# Patient Record
Sex: MALE | Race: WHITE | NOT HISPANIC OR LATINO | Employment: FULL TIME | URBAN - METROPOLITAN AREA
[De-identification: names, ages, dates, MRNs, and addresses within clinical notes are randomized per-mention and may not be internally consistent; named-entity substitution may affect disease eponyms.]

---

## 2017-07-10 LAB — CRC RECOMMENDATION EXT: NORMAL

## 2018-01-26 ENCOUNTER — HISTORICAL (OUTPATIENT)
Dept: ADMINISTRATIVE | Facility: HOSPITAL | Age: 53
End: 2018-01-26

## 2018-01-26 LAB
ABS NEUT (OLG): 3.68 X10(3)/MCL (ref 2.1–9.2)
ALBUMIN SERPL-MCNC: 4.2 GM/DL (ref 3.4–5)
ALBUMIN/GLOB SERPL: 1.2 {RATIO}
ALP SERPL-CCNC: 102 UNIT/L (ref 50–136)
ALT SERPL-CCNC: 52 UNIT/L (ref 12–78)
AST SERPL-CCNC: 26 UNIT/L (ref 15–37)
BASOPHILS # BLD AUTO: 0.1 X10(3)/MCL (ref 0–0.2)
BASOPHILS NFR BLD AUTO: 1 %
BILIRUB SERPL-MCNC: 0.9 MG/DL (ref 0.2–1)
BILIRUBIN DIRECT+TOT PNL SERPL-MCNC: 0.2 MG/DL (ref 0–0.2)
BILIRUBIN DIRECT+TOT PNL SERPL-MCNC: 0.7 MG/DL (ref 0–0.8)
BUN SERPL-MCNC: 13 MG/DL (ref 7–18)
CALCIUM SERPL-MCNC: 9.3 MG/DL (ref 8.5–10.1)
CHLORIDE SERPL-SCNC: 104 MMOL/L (ref 98–107)
CHOLEST SERPL-MCNC: 179 MG/DL (ref 0–200)
CHOLEST/HDLC SERPL: 5.6 {RATIO} (ref 0–5)
CO2 SERPL-SCNC: 27 MMOL/L (ref 21–32)
CREAT SERPL-MCNC: 1.25 MG/DL (ref 0.7–1.3)
EOSINOPHIL # BLD AUTO: 0.4 X10(3)/MCL (ref 0–0.9)
EOSINOPHIL NFR BLD AUTO: 6 %
ERYTHROCYTE [DISTWIDTH] IN BLOOD BY AUTOMATED COUNT: 12.1 % (ref 11.5–17)
GLOBULIN SER-MCNC: 3.4 GM/DL (ref 2.4–3.5)
GLUCOSE SERPL-MCNC: 96 MG/DL (ref 74–106)
HCT VFR BLD AUTO: 46.9 % (ref 42–52)
HDLC SERPL-MCNC: 32 MG/DL (ref 35–60)
HGB BLD-MCNC: 15.9 GM/DL (ref 14–18)
LDLC SERPL CALC-MCNC: 117 MG/DL (ref 0–129)
LYMPHOCYTES # BLD AUTO: 1.6 X10(3)/MCL (ref 0.6–4.6)
LYMPHOCYTES NFR BLD AUTO: 25 %
MCH RBC QN AUTO: 30.8 PG (ref 27–31)
MCHC RBC AUTO-ENTMCNC: 33.9 GM/DL (ref 33–36)
MCV RBC AUTO: 90.9 FL (ref 80–94)
MONOCYTES # BLD AUTO: 0.4 X10(3)/MCL (ref 0.1–1.3)
MONOCYTES NFR BLD AUTO: 7 %
NEUTROPHILS # BLD AUTO: 3.68 X10(3)/MCL (ref 1.4–7.9)
NEUTROPHILS NFR BLD AUTO: 60 %
PLATELET # BLD AUTO: 161 X10(3)/MCL (ref 130–400)
PMV BLD AUTO: 10.4 FL (ref 9.4–12.4)
POTASSIUM SERPL-SCNC: 4.4 MMOL/L (ref 3.5–5.1)
PROT SERPL-MCNC: 7.6 GM/DL (ref 6.4–8.2)
PSA SERPL-MCNC: 1.95 NG/ML (ref 0–4)
RBC # BLD AUTO: 5.16 X10(6)/MCL (ref 4.7–6.1)
SODIUM SERPL-SCNC: 140 MMOL/L (ref 136–145)
TRIGL SERPL-MCNC: 152 MG/DL (ref 30–150)
VLDLC SERPL CALC-MCNC: 30 MG/DL
WBC # SPEC AUTO: 6.1 X10(3)/MCL (ref 4.5–11.5)

## 2019-03-15 ENCOUNTER — HISTORICAL (OUTPATIENT)
Dept: ADMINISTRATIVE | Facility: HOSPITAL | Age: 54
End: 2019-03-15

## 2019-03-15 LAB
ABS NEUT (OLG): 3.19 X10(3)/MCL (ref 2.1–9.2)
ALBUMIN SERPL-MCNC: 3.9 GM/DL (ref 3.4–5)
ALBUMIN/GLOB SERPL: 1.4 RATIO (ref 1.1–2)
ALP SERPL-CCNC: 89 UNIT/L (ref 50–136)
ALT SERPL-CCNC: 90 UNIT/L (ref 12–78)
APPEARANCE, UA: CLEAR
AST SERPL-CCNC: 35 UNIT/L (ref 15–37)
BACTERIA SPEC CULT: NORMAL /HPF
BASOPHILS # BLD AUTO: 0.1 X10(3)/MCL (ref 0–0.2)
BASOPHILS NFR BLD AUTO: 2 %
BILIRUB SERPL-MCNC: 0.7 MG/DL (ref 0.2–1)
BILIRUB UR QL STRIP: NEGATIVE
BILIRUBIN DIRECT+TOT PNL SERPL-MCNC: 0.1 MG/DL (ref 0–0.5)
BILIRUBIN DIRECT+TOT PNL SERPL-MCNC: 0.6 MG/DL (ref 0–0.8)
BUN SERPL-MCNC: 17 MG/DL (ref 7–18)
CALCIUM SERPL-MCNC: 8.8 MG/DL (ref 8.5–10.1)
CHLORIDE SERPL-SCNC: 107 MMOL/L (ref 98–107)
CHOLEST SERPL-MCNC: 202 MG/DL (ref 0–200)
CHOLEST/HDLC SERPL: 6.7 {RATIO} (ref 0–5)
CO2 SERPL-SCNC: 28 MMOL/L (ref 21–32)
COLOR UR: YELLOW
CREAT SERPL-MCNC: 1.18 MG/DL (ref 0.7–1.3)
EOSINOPHIL # BLD AUTO: 0.3 X10(3)/MCL (ref 0–0.9)
EOSINOPHIL NFR BLD AUTO: 5 %
ERYTHROCYTE [DISTWIDTH] IN BLOOD BY AUTOMATED COUNT: 12.1 % (ref 11.5–17)
GLOBULIN SER-MCNC: 2.7 GM/DL (ref 2.4–3.5)
GLUCOSE (UA): NEGATIVE
GLUCOSE SERPL-MCNC: 99 MG/DL (ref 74–106)
HCT VFR BLD AUTO: 43.9 % (ref 42–52)
HDLC SERPL-MCNC: 30 MG/DL (ref 35–60)
HGB BLD-MCNC: 14.6 GM/DL (ref 14–18)
HGB UR QL STRIP: NEGATIVE
KETONES UR QL STRIP: NEGATIVE
LDLC SERPL CALC-MCNC: 128 MG/DL (ref 0–129)
LEUKOCYTE ESTERASE UR QL STRIP: NEGATIVE
LYMPHOCYTES # BLD AUTO: 1.4 X10(3)/MCL (ref 0.6–4.6)
LYMPHOCYTES NFR BLD AUTO: 26 %
MCH RBC QN AUTO: 31.5 PG (ref 27–31)
MCHC RBC AUTO-ENTMCNC: 33.3 GM/DL (ref 33–36)
MCV RBC AUTO: 94.6 FL (ref 80–94)
MONOCYTES # BLD AUTO: 0.5 X10(3)/MCL (ref 0.1–1.3)
MONOCYTES NFR BLD AUTO: 9 %
NEUTROPHILS # BLD AUTO: 3.19 X10(3)/MCL (ref 2.1–9.2)
NEUTROPHILS NFR BLD AUTO: 58 %
NITRITE UR QL STRIP: NEGATIVE
PH UR STRIP: 6 [PH] (ref 5–9)
PLATELET # BLD AUTO: 170 X10(3)/MCL (ref 130–400)
PMV BLD AUTO: 10.8 FL (ref 9.4–12.4)
POTASSIUM SERPL-SCNC: 3.9 MMOL/L (ref 3.5–5.1)
PROT SERPL-MCNC: 6.6 GM/DL (ref 6.4–8.2)
PROT UR QL STRIP: NEGATIVE
PSA SERPL-MCNC: 1.64 NG/ML (ref 0–4)
RBC # BLD AUTO: 4.64 X10(6)/MCL (ref 4.7–6.1)
RBC #/AREA URNS HPF: NORMAL /[HPF]
SODIUM SERPL-SCNC: 141 MMOL/L (ref 136–145)
SP GR UR STRIP: 1.01 (ref 1–1.03)
SQUAMOUS EPITHELIAL, UA: NORMAL
TRIGL SERPL-MCNC: 220 MG/DL (ref 30–150)
UROBILINOGEN UR STRIP-ACNC: 0.2
VLDLC SERPL CALC-MCNC: 44 MG/DL
WBC # SPEC AUTO: 5.5 X10(3)/MCL (ref 4.5–11.5)
WBC #/AREA URNS HPF: NORMAL /HPF

## 2019-05-30 ENCOUNTER — HISTORICAL (OUTPATIENT)
Dept: ADMINISTRATIVE | Facility: HOSPITAL | Age: 54
End: 2019-05-30

## 2019-05-30 LAB
ALBUMIN SERPL-MCNC: 4.1 GM/DL (ref 3.4–5)
ALBUMIN/GLOB SERPL: 1.4 {RATIO}
ALP SERPL-CCNC: 102 UNIT/L (ref 50–136)
ALT SERPL-CCNC: 68 UNIT/L (ref 12–78)
AST SERPL-CCNC: 27 UNIT/L (ref 15–37)
BILIRUB SERPL-MCNC: 0.7 MG/DL (ref 0.2–1)
BILIRUBIN DIRECT+TOT PNL SERPL-MCNC: 0.2 MG/DL (ref 0–0.2)
BILIRUBIN DIRECT+TOT PNL SERPL-MCNC: 0.5 MG/DL (ref 0–0.8)
BUN SERPL-MCNC: 15 MG/DL (ref 7–18)
CALCIUM SERPL-MCNC: 9.3 MG/DL (ref 8.5–10.1)
CHLORIDE SERPL-SCNC: 103 MMOL/L (ref 98–107)
CHOLEST SERPL-MCNC: 177 MG/DL (ref 0–200)
CHOLEST/HDLC SERPL: 5.4 {RATIO} (ref 0–5)
CO2 SERPL-SCNC: 30 MMOL/L (ref 21–32)
CREAT SERPL-MCNC: 1.17 MG/DL (ref 0.7–1.3)
GLOBULIN SER-MCNC: 3 GM/DL (ref 2.4–3.5)
GLUCOSE SERPL-MCNC: 94 MG/DL (ref 74–106)
HDLC SERPL-MCNC: 33 MG/DL (ref 35–60)
LDLC SERPL CALC-MCNC: 82 MG/DL (ref 0–129)
POTASSIUM SERPL-SCNC: 4.2 MMOL/L (ref 3.5–5.1)
PROT SERPL-MCNC: 7.1 GM/DL (ref 6.4–8.2)
SODIUM SERPL-SCNC: 139 MMOL/L (ref 136–145)
TRIGL SERPL-MCNC: 309 MG/DL (ref 30–150)
VLDLC SERPL CALC-MCNC: 62 MG/DL

## 2020-08-11 ENCOUNTER — HISTORICAL (OUTPATIENT)
Dept: ADMINISTRATIVE | Facility: HOSPITAL | Age: 55
End: 2020-08-11

## 2020-08-11 LAB
ABS NEUT (OLG): 3.19 X10(3)/MCL (ref 2.1–9.2)
ALBUMIN SERPL-MCNC: 4.1 GM/DL (ref 3.5–5)
ALBUMIN/GLOB SERPL: 1.6 RATIO (ref 1.1–2)
ALP SERPL-CCNC: 95 UNIT/L (ref 40–150)
ALT SERPL-CCNC: 61 UNIT/L (ref 0–55)
AST SERPL-CCNC: 28 UNIT/L (ref 5–34)
BASOPHILS # BLD AUTO: 0.1 X10(3)/MCL (ref 0–0.2)
BASOPHILS NFR BLD AUTO: 1 %
BILIRUB SERPL-MCNC: 0.9 MG/DL
BILIRUBIN DIRECT+TOT PNL SERPL-MCNC: 0.3 MG/DL (ref 0–0.5)
BILIRUBIN DIRECT+TOT PNL SERPL-MCNC: 0.6 MG/DL (ref 0–0.8)
BUN SERPL-MCNC: 13.6 MG/DL (ref 8.4–25.7)
CALCIUM SERPL-MCNC: 9.2 MG/DL (ref 8.4–10.2)
CHLORIDE SERPL-SCNC: 106 MMOL/L (ref 98–107)
CHOLEST SERPL-MCNC: 180 MG/DL
CHOLEST/HDLC SERPL: 6 {RATIO} (ref 0–5)
CO2 SERPL-SCNC: 27 MMOL/L (ref 22–29)
CREAT SERPL-MCNC: 1.13 MG/DL (ref 0.73–1.18)
EOSINOPHIL # BLD AUTO: 0.2 X10(3)/MCL (ref 0–0.9)
EOSINOPHIL NFR BLD AUTO: 4 %
ERYTHROCYTE [DISTWIDTH] IN BLOOD BY AUTOMATED COUNT: 12.4 % (ref 11.5–17)
GLOBULIN SER-MCNC: 2.6 GM/DL (ref 2.4–3.5)
GLUCOSE SERPL-MCNC: 97 MG/DL (ref 74–100)
HCT VFR BLD AUTO: 43.6 % (ref 42–52)
HDLC SERPL-MCNC: 28 MG/DL (ref 35–60)
HGB BLD-MCNC: 14.6 GM/DL (ref 14–18)
LDLC SERPL CALC-MCNC: 111 MG/DL (ref 50–140)
LYMPHOCYTES # BLD AUTO: 1.3 X10(3)/MCL (ref 0.6–4.6)
LYMPHOCYTES NFR BLD AUTO: 24 %
MCH RBC QN AUTO: 31.7 PG (ref 27–31)
MCHC RBC AUTO-ENTMCNC: 33.5 GM/DL (ref 33–36)
MCV RBC AUTO: 94.6 FL (ref 80–94)
MONOCYTES # BLD AUTO: 0.4 X10(3)/MCL (ref 0.1–1.3)
MONOCYTES NFR BLD AUTO: 8 %
NEUTROPHILS # BLD AUTO: 3.19 X10(3)/MCL (ref 2.1–9.2)
NEUTROPHILS NFR BLD AUTO: 61 %
PLATELET # BLD AUTO: 177 X10(3)/MCL (ref 130–400)
PMV BLD AUTO: 10.4 FL (ref 9.4–12.4)
POTASSIUM SERPL-SCNC: 4.4 MMOL/L (ref 3.5–5.1)
PROT SERPL-MCNC: 6.7 GM/DL (ref 6.4–8.3)
PSA SERPL-MCNC: 1.41 NG/ML
RBC # BLD AUTO: 4.61 X10(6)/MCL (ref 4.7–6.1)
SODIUM SERPL-SCNC: 142 MMOL/L (ref 136–145)
TRIGL SERPL-MCNC: 205 MG/DL (ref 34–140)
VLDLC SERPL CALC-MCNC: 41 MG/DL
WBC # SPEC AUTO: 5.2 X10(3)/MCL (ref 4.5–11.5)

## 2021-02-18 ENCOUNTER — HISTORICAL (OUTPATIENT)
Dept: ADMINISTRATIVE | Facility: HOSPITAL | Age: 56
End: 2021-02-18

## 2021-02-18 LAB
ALBUMIN SERPL-MCNC: 4.3 GM/DL (ref 3.5–5)
ALBUMIN/GLOB SERPL: 1.7 RATIO (ref 1.1–2)
ALP SERPL-CCNC: 96 UNIT/L (ref 40–150)
ALT SERPL-CCNC: 75 UNIT/L (ref 0–55)
AST SERPL-CCNC: 33 UNIT/L (ref 5–34)
BILIRUB SERPL-MCNC: 0.8 MG/DL
BILIRUBIN DIRECT+TOT PNL SERPL-MCNC: 0.3 MG/DL (ref 0–0.5)
BILIRUBIN DIRECT+TOT PNL SERPL-MCNC: 0.5 MG/DL (ref 0–0.8)
BUN SERPL-MCNC: 16.2 MG/DL (ref 8.4–25.7)
CALCIUM SERPL-MCNC: 9.4 MG/DL (ref 8.4–10.2)
CHLORIDE SERPL-SCNC: 108 MMOL/L (ref 98–107)
CHOLEST SERPL-MCNC: 183 MG/DL
CHOLEST/HDLC SERPL: 7 {RATIO} (ref 0–5)
CO2 SERPL-SCNC: 27 MMOL/L (ref 22–29)
CREAT SERPL-MCNC: 1.19 MG/DL (ref 0.73–1.18)
GLOBULIN SER-MCNC: 2.6 GM/DL (ref 2.4–3.5)
GLUCOSE SERPL-MCNC: 92 MG/DL (ref 74–100)
HDLC SERPL-MCNC: 26 MG/DL (ref 35–60)
LDLC SERPL CALC-MCNC: 107 MG/DL (ref 50–140)
POTASSIUM SERPL-SCNC: 4 MMOL/L (ref 3.5–5.1)
PROT SERPL-MCNC: 6.9 GM/DL (ref 6.4–8.3)
SODIUM SERPL-SCNC: 144 MMOL/L (ref 136–145)
TRIGL SERPL-MCNC: 248 MG/DL (ref 34–140)
VLDLC SERPL CALC-MCNC: 50 MG/DL

## 2021-03-11 ENCOUNTER — HISTORICAL (OUTPATIENT)
Dept: RADIOLOGY | Facility: HOSPITAL | Age: 56
End: 2021-03-11

## 2021-08-13 ENCOUNTER — HISTORICAL (OUTPATIENT)
Dept: ADMINISTRATIVE | Facility: HOSPITAL | Age: 56
End: 2021-08-13

## 2021-08-13 LAB
ABS NEUT (OLG): 3.7 X10(3)/MCL (ref 2.1–9.2)
ALBUMIN SERPL-MCNC: 4.2 GM/DL (ref 3.5–5)
ALBUMIN/GLOB SERPL: 1.6 RATIO (ref 1.1–2)
ALP SERPL-CCNC: 92 UNIT/L (ref 40–150)
ALT SERPL-CCNC: 50 UNIT/L (ref 0–55)
AST SERPL-CCNC: 30 UNIT/L (ref 5–34)
BASOPHILS # BLD AUTO: 0.1 X10(3)/MCL (ref 0–0.2)
BASOPHILS NFR BLD AUTO: 1 %
BILIRUB SERPL-MCNC: 0.6 MG/DL
BILIRUBIN DIRECT+TOT PNL SERPL-MCNC: 0.2 MG/DL (ref 0–0.5)
BILIRUBIN DIRECT+TOT PNL SERPL-MCNC: 0.4 MG/DL (ref 0–0.8)
BUN SERPL-MCNC: 13 MG/DL (ref 8.4–25.7)
CALCIUM SERPL-MCNC: 9.3 MG/DL (ref 8.4–10.2)
CHLORIDE SERPL-SCNC: 107 MMOL/L (ref 98–107)
CHOLEST SERPL-MCNC: 182 MG/DL
CHOLEST/HDLC SERPL: 6 {RATIO} (ref 0–5)
CO2 SERPL-SCNC: 28 MMOL/L (ref 22–29)
CREAT SERPL-MCNC: 1.14 MG/DL (ref 0.73–1.18)
EOSINOPHIL # BLD AUTO: 0.2 X10(3)/MCL (ref 0–0.9)
EOSINOPHIL NFR BLD AUTO: 4 %
ERYTHROCYTE [DISTWIDTH] IN BLOOD BY AUTOMATED COUNT: 12.1 % (ref 11.5–17)
GLOBULIN SER-MCNC: 2.6 GM/DL (ref 2.4–3.5)
GLUCOSE SERPL-MCNC: 95 MG/DL (ref 74–100)
HCT VFR BLD AUTO: 45 % (ref 42–52)
HDLC SERPL-MCNC: 31 MG/DL (ref 35–60)
HGB BLD-MCNC: 15.4 GM/DL (ref 14–18)
LDLC SERPL CALC-MCNC: 118 MG/DL (ref 50–140)
LYMPHOCYTES # BLD AUTO: 1.6 X10(3)/MCL (ref 0.6–4.6)
LYMPHOCYTES NFR BLD AUTO: 26 %
MCH RBC QN AUTO: 32 PG (ref 27–31)
MCHC RBC AUTO-ENTMCNC: 34.2 GM/DL (ref 33–36)
MCV RBC AUTO: 93.6 FL (ref 80–94)
MONOCYTES # BLD AUTO: 0.5 X10(3)/MCL (ref 0.1–1.3)
MONOCYTES NFR BLD AUTO: 8 %
NEUTROPHILS # BLD AUTO: 3.7 X10(3)/MCL (ref 2.1–9.2)
NEUTROPHILS NFR BLD AUTO: 61 %
PLATELET # BLD AUTO: 173 X10(3)/MCL (ref 130–400)
PMV BLD AUTO: 11.3 FL (ref 9.4–12.4)
POTASSIUM SERPL-SCNC: 4.5 MMOL/L (ref 3.5–5.1)
PROT SERPL-MCNC: 6.8 GM/DL (ref 6.4–8.3)
PSA SERPL-MCNC: 2.14 NG/ML
RBC # BLD AUTO: 4.81 X10(6)/MCL (ref 4.7–6.1)
SODIUM SERPL-SCNC: 144 MMOL/L (ref 136–145)
TRIGL SERPL-MCNC: 165 MG/DL (ref 34–140)
VLDLC SERPL CALC-MCNC: 33 MG/DL
WBC # SPEC AUTO: 6.1 X10(3)/MCL (ref 4.5–11.5)

## 2022-04-11 ENCOUNTER — HISTORICAL (OUTPATIENT)
Dept: ADMINISTRATIVE | Facility: HOSPITAL | Age: 57
End: 2022-04-11

## 2022-04-27 VITALS
BODY MASS INDEX: 33.33 KG/M2 | WEIGHT: 212.38 LBS | OXYGEN SATURATION: 95 % | SYSTOLIC BLOOD PRESSURE: 130 MMHG | HEIGHT: 67 IN | DIASTOLIC BLOOD PRESSURE: 86 MMHG

## 2022-08-16 DIAGNOSIS — E78.5 HYPERLIPIDEMIA, UNSPECIFIED HYPERLIPIDEMIA TYPE: ICD-10-CM

## 2022-08-16 DIAGNOSIS — K57.92 DIVERTICULITIS: ICD-10-CM

## 2022-08-16 DIAGNOSIS — I10 HYPERTENSION, UNSPECIFIED TYPE: ICD-10-CM

## 2022-08-16 DIAGNOSIS — Z00.00 WELLNESS EXAMINATION: Primary | ICD-10-CM

## 2022-08-23 ENCOUNTER — LAB VISIT (OUTPATIENT)
Dept: LAB | Facility: HOSPITAL | Age: 57
End: 2022-08-23
Attending: INTERNAL MEDICINE
Payer: COMMERCIAL

## 2022-08-23 DIAGNOSIS — I10 HYPERTENSION, UNSPECIFIED TYPE: ICD-10-CM

## 2022-08-23 DIAGNOSIS — E78.5 HYPERLIPIDEMIA, UNSPECIFIED HYPERLIPIDEMIA TYPE: ICD-10-CM

## 2022-08-23 DIAGNOSIS — Z00.00 WELLNESS EXAMINATION: ICD-10-CM

## 2022-08-23 DIAGNOSIS — K57.92 DIVERTICULITIS: ICD-10-CM

## 2022-08-23 LAB
ALBUMIN SERPL-MCNC: 4.1 GM/DL (ref 3.5–5)
ALBUMIN/GLOB SERPL: 1.6 RATIO (ref 1.1–2)
ALP SERPL-CCNC: 95 UNIT/L (ref 40–150)
ALT SERPL-CCNC: 48 UNIT/L (ref 0–55)
AST SERPL-CCNC: 29 UNIT/L (ref 5–34)
BASOPHILS # BLD AUTO: 0.05 X10(3)/MCL (ref 0–0.2)
BASOPHILS NFR BLD AUTO: 1 %
BILIRUBIN DIRECT+TOT PNL SERPL-MCNC: 1 MG/DL
BUN SERPL-MCNC: 13.8 MG/DL (ref 8.4–25.7)
CALCIUM SERPL-MCNC: 9.6 MG/DL (ref 8.4–10.2)
CHLORIDE SERPL-SCNC: 106 MMOL/L (ref 98–107)
CHOLEST SERPL-MCNC: 174 MG/DL
CHOLEST/HDLC SERPL: 6 {RATIO} (ref 0–5)
CO2 SERPL-SCNC: 28 MMOL/L (ref 22–29)
CREAT SERPL-MCNC: 1.28 MG/DL (ref 0.73–1.18)
EOSINOPHIL # BLD AUTO: 0.28 X10(3)/MCL (ref 0–0.9)
EOSINOPHIL NFR BLD AUTO: 5.4 %
ERYTHROCYTE [DISTWIDTH] IN BLOOD BY AUTOMATED COUNT: 12.4 % (ref 11.5–17)
GFR SERPLBLD CREATININE-BSD FMLA CKD-EPI: >60 MLS/MIN/1.73/M2
GLOBULIN SER-MCNC: 2.6 GM/DL (ref 2.4–3.5)
GLUCOSE SERPL-MCNC: 96 MG/DL (ref 74–100)
HCT VFR BLD AUTO: 44.3 % (ref 42–52)
HDLC SERPL-MCNC: 31 MG/DL (ref 35–60)
HGB BLD-MCNC: 14.9 GM/DL (ref 14–18)
IMM GRANULOCYTES # BLD AUTO: 0.01 X10(3)/MCL (ref 0–0.04)
IMM GRANULOCYTES NFR BLD AUTO: 0.2 %
LDLC SERPL CALC-MCNC: 101 MG/DL (ref 50–140)
LYMPHOCYTES # BLD AUTO: 1.32 X10(3)/MCL (ref 0.6–4.6)
LYMPHOCYTES NFR BLD AUTO: 25.4 %
MCH RBC QN AUTO: 31.8 PG (ref 27–31)
MCHC RBC AUTO-ENTMCNC: 33.6 MG/DL (ref 33–36)
MCV RBC AUTO: 94.5 FL (ref 80–94)
MONOCYTES # BLD AUTO: 0.45 X10(3)/MCL (ref 0.1–1.3)
MONOCYTES NFR BLD AUTO: 8.7 %
NEUTROPHILS # BLD AUTO: 3.1 X10(3)/MCL (ref 2.1–9.2)
NEUTROPHILS NFR BLD AUTO: 59.3 %
NRBC BLD AUTO-RTO: 0 %
PLATELET # BLD AUTO: 165 X10(3)/MCL (ref 130–400)
PMV BLD AUTO: 11.4 FL (ref 7.4–10.4)
POTASSIUM SERPL-SCNC: 4.4 MMOL/L (ref 3.5–5.1)
PROT SERPL-MCNC: 6.7 GM/DL (ref 6.4–8.3)
PSA SERPL-MCNC: 1.98 NG/ML
RBC # BLD AUTO: 4.69 X10(6)/MCL (ref 4.7–6.1)
SODIUM SERPL-SCNC: 140 MMOL/L (ref 136–145)
TRIGL SERPL-MCNC: 208 MG/DL (ref 34–140)
VLDLC SERPL CALC-MCNC: 42 MG/DL
WBC # SPEC AUTO: 5.2 X10(3)/MCL (ref 4.5–11.5)

## 2022-08-23 PROCEDURE — 84153 ASSAY OF PSA TOTAL: CPT

## 2022-08-23 PROCEDURE — 85025 COMPLETE CBC W/AUTO DIFF WBC: CPT

## 2022-08-23 PROCEDURE — 36415 COLL VENOUS BLD VENIPUNCTURE: CPT

## 2022-08-23 PROCEDURE — 80061 LIPID PANEL: CPT

## 2022-08-23 PROCEDURE — 80053 COMPREHEN METABOLIC PANEL: CPT

## 2022-08-24 ENCOUNTER — OFFICE VISIT (OUTPATIENT)
Dept: INTERNAL MEDICINE | Facility: CLINIC | Age: 57
End: 2022-08-24
Payer: COMMERCIAL

## 2022-08-24 VITALS
HEIGHT: 66 IN | DIASTOLIC BLOOD PRESSURE: 74 MMHG | WEIGHT: 213.63 LBS | BODY MASS INDEX: 34.33 KG/M2 | HEART RATE: 76 BPM | SYSTOLIC BLOOD PRESSURE: 126 MMHG | RESPIRATION RATE: 18 BRPM | OXYGEN SATURATION: 95 %

## 2022-08-24 DIAGNOSIS — R79.89 AZOTEMIA: ICD-10-CM

## 2022-08-24 DIAGNOSIS — E78.5 HYPERLIPIDEMIA, UNSPECIFIED HYPERLIPIDEMIA TYPE: ICD-10-CM

## 2022-08-24 DIAGNOSIS — K76.0 STEATOSIS OF LIVER: ICD-10-CM

## 2022-08-24 DIAGNOSIS — I10 HYPERTENSION, UNSPECIFIED TYPE: ICD-10-CM

## 2022-08-24 DIAGNOSIS — Z00.00 WELLNESS EXAMINATION: Primary | ICD-10-CM

## 2022-08-24 DIAGNOSIS — K63.5 BENIGN COLONIC POLYP: ICD-10-CM

## 2022-08-24 DIAGNOSIS — G47.33 OBSTRUCTIVE SLEEP APNEA SYNDROME: ICD-10-CM

## 2022-08-24 PROBLEM — J45.909 ASTHMATIC BRONCHITIS: Status: ACTIVE | Noted: 2022-08-24

## 2022-08-24 PROBLEM — E66.9 OBESITY: Status: ACTIVE | Noted: 2022-08-24

## 2022-08-24 PROBLEM — K57.92 DIVERTICULITIS: Status: ACTIVE | Noted: 2022-08-24

## 2022-08-24 PROCEDURE — 1159F MED LIST DOCD IN RCRD: CPT | Mod: CPTII,,, | Performed by: INTERNAL MEDICINE

## 2022-08-24 PROCEDURE — 1159F PR MEDICATION LIST DOCUMENTED IN MEDICAL RECORD: ICD-10-PCS | Mod: CPTII,,, | Performed by: INTERNAL MEDICINE

## 2022-08-24 PROCEDURE — 3008F PR BODY MASS INDEX (BMI) DOCUMENTED: ICD-10-PCS | Mod: CPTII,,, | Performed by: INTERNAL MEDICINE

## 2022-08-24 PROCEDURE — 99396 PREV VISIT EST AGE 40-64: CPT | Mod: ,,, | Performed by: INTERNAL MEDICINE

## 2022-08-24 PROCEDURE — 3074F PR MOST RECENT SYSTOLIC BLOOD PRESSURE < 130 MM HG: ICD-10-PCS | Mod: CPTII,,, | Performed by: INTERNAL MEDICINE

## 2022-08-24 PROCEDURE — 3078F DIAST BP <80 MM HG: CPT | Mod: CPTII,,, | Performed by: INTERNAL MEDICINE

## 2022-08-24 PROCEDURE — 3074F SYST BP LT 130 MM HG: CPT | Mod: CPTII,,, | Performed by: INTERNAL MEDICINE

## 2022-08-24 PROCEDURE — 99396 PR PREVENTIVE VISIT,EST,40-64: ICD-10-PCS | Mod: ,,, | Performed by: INTERNAL MEDICINE

## 2022-08-24 PROCEDURE — 3078F PR MOST RECENT DIASTOLIC BLOOD PRESSURE < 80 MM HG: ICD-10-PCS | Mod: CPTII,,, | Performed by: INTERNAL MEDICINE

## 2022-08-24 PROCEDURE — 3008F BODY MASS INDEX DOCD: CPT | Mod: CPTII,,, | Performed by: INTERNAL MEDICINE

## 2022-08-24 RX ORDER — LOSARTAN POTASSIUM AND HYDROCHLOROTHIAZIDE 12.5; 5 MG/1; MG/1
1 TABLET ORAL DAILY
Qty: 90 TABLET | Refills: 3 | Status: SHIPPED | OUTPATIENT
Start: 2022-08-24 | End: 2023-08-30 | Stop reason: SDUPTHER

## 2022-08-24 NOTE — PROGRESS NOTES
"Subjective:       Patient ID: Mariano Fontaine Jr. is a 56 y.o. male.    Chief Complaint: Annual Exam      The patient is a 56-year-old man in for wellness check.  He feels fine overall.  He has been doing fairly well with his exercise program.  Improving with his diet but at least he is maintained his weight.      He does not take Advil or Aleve.  Occasional BC powders and occasional Tylenol.  Diet is more less regular without protein restriction or supplements.  He probably could do better with hydration.    Review of Systems     Current Outpatient Medications on File Prior to Visit   Medication Sig Dispense Refill    [DISCONTINUED] losartan-hydrochlorothiazide 50-12.5 mg (HYZAAR) 50-12.5 mg per tablet TAKE 1 TABLET BY MOUTH EVERY DAY 90 tablet 3     No current facility-administered medications on file prior to visit.     Objective:      /74 (BP Location: Right arm, Patient Position: Sitting, BP Method: Large (Manual))   Pulse 76   Resp 18   Ht 5' 6" (1.676 m)   Wt 96.9 kg (213 lb 9.6 oz)   SpO2 95%   BMI 34.48 kg/m²     Physical Exam  Vitals reviewed.   Constitutional:       Appearance: Normal appearance.   HENT:      Head: Normocephalic and atraumatic.      Right Ear: Tympanic membrane normal.      Left Ear: Tympanic membrane normal.      Mouth/Throat:      Pharynx: Oropharynx is clear.   Eyes:      Pupils: Pupils are equal, round, and reactive to light.   Neck:      Vascular: No carotid bruit.   Cardiovascular:      Rate and Rhythm: Normal rate and regular rhythm.      Pulses: Normal pulses.      Heart sounds: Normal heart sounds.   Pulmonary:      Effort: Pulmonary effort is normal.      Breath sounds: Normal breath sounds.   Abdominal:      General: Abdomen is flat.      Palpations: Abdomen is soft. There is no mass.      Tenderness: There is no abdominal tenderness. There is no guarding.   Genitourinary:     Comments: Rectal exam reveals normal tone.  Large external hemorrhoids noted.  Prostate " medium-sized smooth without nodularity.  Musculoskeletal:         General: No swelling.      Cervical back: Neck supple.   Lymphadenopathy:      Cervical: No cervical adenopathy.   Skin:     General: Skin is warm and dry.   Neurological:      General: No focal deficit present.      Mental Status: He is alert and oriented to person, place, and time.         Assessment:       1. Wellness examination    2. Hypertension, unspecified type    3. Hyperlipidemia, unspecified hyperlipidemia type    4. Benign colonic polyp    5. Obstructive sleep apnea syndrome    6. Steatosis of liver    7. Azotemia      1. Overall stable and generally healthy    2. Controlled hypertension    3. Controlled hyperlipidemia.  He appeared to have a metabolic type syndrome    4. History of colon polyps.  Followed by Dr. Rendon.  Scope is to be scheduled this year he will arrange    5. History of obstructive sleep apnea.  Stable     6. Fatty liver.  Transaminases remain normal     6. Azotemia.  Relatively mild and likely not significant.  We discussed follow-up arrangements and I think 1 year of be acceptable and he agreed, to stay well hydrated avoid nonsteroidals and avoid excess protein in the diet.    Plan:       Same meds with with increased TLC.  Follow-up yearly with CBC CMP lipid PSA TSH and urinalysis prior.

## 2022-10-18 ENCOUNTER — DOCUMENTATION ONLY (OUTPATIENT)
Dept: ADMINISTRATIVE | Facility: HOSPITAL | Age: 57
End: 2022-10-18
Payer: COMMERCIAL

## 2022-11-28 PROBLEM — Z00.00 WELLNESS EXAMINATION: Status: RESOLVED | Noted: 2022-08-24 | Resolved: 2022-11-28

## 2022-12-21 ENCOUNTER — OFFICE VISIT (OUTPATIENT)
Dept: INTERNAL MEDICINE | Facility: CLINIC | Age: 57
End: 2022-12-21
Payer: COMMERCIAL

## 2022-12-21 VITALS
WEIGHT: 214.81 LBS | OXYGEN SATURATION: 96 % | BODY MASS INDEX: 34.52 KG/M2 | HEART RATE: 77 BPM | SYSTOLIC BLOOD PRESSURE: 132 MMHG | DIASTOLIC BLOOD PRESSURE: 80 MMHG | RESPIRATION RATE: 18 BRPM | HEIGHT: 66 IN

## 2022-12-21 DIAGNOSIS — I10 HYPERTENSION, UNSPECIFIED TYPE: Primary | ICD-10-CM

## 2022-12-21 DIAGNOSIS — M79.675 PAIN IN TOES OF BOTH FEET: ICD-10-CM

## 2022-12-21 DIAGNOSIS — R79.89 AZOTEMIA: ICD-10-CM

## 2022-12-21 DIAGNOSIS — E78.5 HYPERLIPIDEMIA, UNSPECIFIED HYPERLIPIDEMIA TYPE: ICD-10-CM

## 2022-12-21 DIAGNOSIS — M79.674 PAIN IN TOES OF BOTH FEET: ICD-10-CM

## 2022-12-21 DIAGNOSIS — M76.60 ACHILLES TENDINITIS, UNSPECIFIED LATERALITY: ICD-10-CM

## 2022-12-21 PROCEDURE — 1159F PR MEDICATION LIST DOCUMENTED IN MEDICAL RECORD: ICD-10-PCS | Mod: CPTII,,, | Performed by: INTERNAL MEDICINE

## 2022-12-21 PROCEDURE — 3008F BODY MASS INDEX DOCD: CPT | Mod: CPTII,,, | Performed by: INTERNAL MEDICINE

## 2022-12-21 PROCEDURE — 3008F PR BODY MASS INDEX (BMI) DOCUMENTED: ICD-10-PCS | Mod: CPTII,,, | Performed by: INTERNAL MEDICINE

## 2022-12-21 PROCEDURE — 3075F PR MOST RECENT SYSTOLIC BLOOD PRESS GE 130-139MM HG: ICD-10-PCS | Mod: CPTII,,, | Performed by: INTERNAL MEDICINE

## 2022-12-21 PROCEDURE — 3079F PR MOST RECENT DIASTOLIC BLOOD PRESSURE 80-89 MM HG: ICD-10-PCS | Mod: CPTII,,, | Performed by: INTERNAL MEDICINE

## 2022-12-21 PROCEDURE — 3079F DIAST BP 80-89 MM HG: CPT | Mod: CPTII,,, | Performed by: INTERNAL MEDICINE

## 2022-12-21 PROCEDURE — 99213 PR OFFICE/OUTPT VISIT, EST, LEVL III, 20-29 MIN: ICD-10-PCS | Mod: ,,, | Performed by: INTERNAL MEDICINE

## 2022-12-21 PROCEDURE — 99213 OFFICE O/P EST LOW 20 MIN: CPT | Mod: ,,, | Performed by: INTERNAL MEDICINE

## 2022-12-21 PROCEDURE — 3075F SYST BP GE 130 - 139MM HG: CPT | Mod: CPTII,,, | Performed by: INTERNAL MEDICINE

## 2022-12-21 PROCEDURE — 1159F MED LIST DOCD IN RCRD: CPT | Mod: CPTII,,, | Performed by: INTERNAL MEDICINE

## 2022-12-21 NOTE — PROGRESS NOTES
"Subjective:       Patient ID: Mariano Fontaine Jr. is a 57 y.o. male.    Chief Complaint: Foot Pain (Knot on left heel tendon, gout(he thinks in left big toe). He also states it may be gout in right big toe also)      The patient is a 57-year-old man in for problems with heel pain.  He recently had a bout of Achilles tendinitis on the right and now he is had an episode on the left.  Somewhat complicated by toe pain he thinks he may have gout as well.  He has seen the podiatrist.  He has not been doing stretching routinely.  He is not been using ice.  He has been wearing good footwear.  He is really not sure why it started.    Foot Pain    Review of Systems     Current Outpatient Medications on File Prior to Visit   Medication Sig Dispense Refill    losartan-hydrochlorothiazide 50-12.5 mg (HYZAAR) 50-12.5 mg per tablet Take 1 tablet by mouth once daily. 90 tablet 3     No current facility-administered medications on file prior to visit.     Objective:      /80 (BP Location: Left arm, Patient Position: Sitting, BP Method: Large (Manual))   Pulse 77   Resp 18   Ht 5' 6" (1.676 m)   Wt 97.4 kg (214 lb 12.8 oz)   SpO2 96%   BMI 34.67 kg/m²     Physical Exam      He is in no distress.  Vital signs are stable.    Extremities reveal no clubbing cyanosis or edema.  Foot pulses intact.  There is a tender bump on the left heel near the insertion of the Achilles tendon.  There is no real tenderness of the tendon higher up.  Assessment:       1. Hypertension, unspecified type    2. Hyperlipidemia, unspecified hyperlipidemia type    3. Azotemia    4. Achilles tendinitis, unspecified laterality    5. Pain in toes of both feet          Plan:       1. Recurrent Achilles tendonitis.  Now on the left side he is already had issues on the right and also plantar fasciitis on the right.      2. Hypertension.  Control     3. Hyperlipidemia.  Stable     4. Slight azotemia.      5. Concerns for gout.      Check uric acid and BMP " today.  Ice stretching etc. discussed.  Foot where also.  Follow-up with me as scheduled.  He should avoid fluoroquinolones

## 2022-12-23 ENCOUNTER — TELEPHONE (OUTPATIENT)
Dept: INTERNAL MEDICINE | Facility: CLINIC | Age: 57
End: 2022-12-23
Payer: COMMERCIAL

## 2022-12-23 DIAGNOSIS — M10.9 GOUT, UNSPECIFIED CAUSE, UNSPECIFIED CHRONICITY, UNSPECIFIED SITE: Primary | ICD-10-CM

## 2022-12-23 RX ORDER — ALLOPURINOL 300 MG/1
300 TABLET ORAL DAILY
Qty: 30 TABLET | Status: SHIPPED | OUTPATIENT
Start: 2022-12-23 | End: 2023-08-30

## 2022-12-23 NOTE — TELEPHONE ENCOUNTER
----- Message from Kevin Carmen MD sent at 12/22/2022  5:15 PM CST -----  Tell him chemistry test is normal.  Kidney function and electrolytes were normal.  However uric acid is quite high and is consistent with gout.  I do recommend that he start taking a medicine to lower the uric acid and prevent gout attacks.  That medicine is allopurinol 300 mg, number 30, 1 p.o. daily with p.r.n. refills.  This is a maintenance medication to prevent gout attacks and also to help prevent uric acid kidney stones.  ----- Message -----  From: Background User Lab  Sent: 12/21/2022   6:51 PM CST  To: Kevin Carmen MD

## 2023-01-16 LAB — CRC RECOMMENDATION EXT: NORMAL

## 2023-01-17 ENCOUNTER — DOCUMENTATION ONLY (OUTPATIENT)
Dept: INTERNAL MEDICINE | Facility: CLINIC | Age: 58
End: 2023-01-17
Payer: COMMERCIAL

## 2023-08-11 ENCOUNTER — TELEPHONE (OUTPATIENT)
Dept: INTERNAL MEDICINE | Facility: CLINIC | Age: 58
End: 2023-08-11
Payer: COMMERCIAL

## 2023-08-11 DIAGNOSIS — K57.92 DIVERTICULITIS: Primary | ICD-10-CM

## 2023-08-11 RX ORDER — AMOXICILLIN AND CLAVULANATE POTASSIUM 875; 125 MG/1; MG/1
1 TABLET, FILM COATED ORAL EVERY 12 HOURS
Qty: 20 TABLET | Refills: 1 | Status: SHIPPED | OUTPATIENT
Start: 2023-08-11 | End: 2023-08-30

## 2023-08-11 NOTE — TELEPHONE ENCOUNTER
----- Message from Berkley Monaco sent at 8/11/2023  8:07 AM CDT -----  Regarding: refill  Type:  RX Refill Request    Who Called: pt's wife pawan  Refill or New Rx:refill  RX Name and Strength:medication for diverticulitis  How is the patient currently taking it? (ex. 1XDay):  Is this a 30 day or 90 day RX:  Preferred Pharmacy with phone number:[facility] in [place]  Local or Mail Order:local  Ordering Provider:dr milian  Would the patient rather a call back or a response via [facility]? C/b  Best Call Back Number:[phone]  Additional Information: bout with diverticulitis started 2 days ago

## 2023-08-23 ENCOUNTER — TELEPHONE (OUTPATIENT)
Dept: INTERNAL MEDICINE | Facility: CLINIC | Age: 58
End: 2023-08-23
Payer: COMMERCIAL

## 2023-08-23 NOTE — TELEPHONE ENCOUNTER
----- Message from Neeta Meier LPN sent at 8/22/2023 11:03 AM CDT -----  Regarding: elijah Manriquez 8/30 @9:20  Fasting labs needed.

## 2023-08-29 ENCOUNTER — LAB VISIT (OUTPATIENT)
Dept: LAB | Facility: HOSPITAL | Age: 58
End: 2023-08-29
Attending: INTERNAL MEDICINE
Payer: COMMERCIAL

## 2023-08-29 DIAGNOSIS — E78.5 HYPERLIPIDEMIA, UNSPECIFIED HYPERLIPIDEMIA TYPE: ICD-10-CM

## 2023-08-29 DIAGNOSIS — R79.89 AZOTEMIA: ICD-10-CM

## 2023-08-29 DIAGNOSIS — I10 HYPERTENSION, UNSPECIFIED TYPE: ICD-10-CM

## 2023-08-29 DIAGNOSIS — Z00.00 WELLNESS EXAMINATION: ICD-10-CM

## 2023-08-29 LAB
ALBUMIN SERPL-MCNC: 4 G/DL (ref 3.5–5)
ALBUMIN/GLOB SERPL: 1.5 RATIO (ref 1.1–2)
ALP SERPL-CCNC: 99 UNIT/L (ref 40–150)
ALT SERPL-CCNC: 48 UNIT/L (ref 0–55)
APPEARANCE UR: CLEAR
AST SERPL-CCNC: 30 UNIT/L (ref 5–34)
BACTERIA #/AREA URNS AUTO: NORMAL /HPF
BASOPHILS # BLD AUTO: 0.07 X10(3)/MCL
BASOPHILS NFR BLD AUTO: 1.4 %
BILIRUB SERPL-MCNC: 1 MG/DL
BILIRUB UR QL STRIP.AUTO: NEGATIVE
BUN SERPL-MCNC: 14.9 MG/DL (ref 8.4–25.7)
CALCIUM SERPL-MCNC: 9.4 MG/DL (ref 8.4–10.2)
CHLORIDE SERPL-SCNC: 109 MMOL/L (ref 98–107)
CHOLEST SERPL-MCNC: 187 MG/DL
CHOLEST/HDLC SERPL: 6 {RATIO} (ref 0–5)
CO2 SERPL-SCNC: 25 MMOL/L (ref 22–29)
COLOR UR: YELLOW
CREAT SERPL-MCNC: 1.17 MG/DL (ref 0.73–1.18)
EOSINOPHIL # BLD AUTO: 0.25 X10(3)/MCL (ref 0–0.9)
EOSINOPHIL NFR BLD AUTO: 4.8 %
ERYTHROCYTE [DISTWIDTH] IN BLOOD BY AUTOMATED COUNT: 12.1 % (ref 11.5–17)
GFR SERPLBLD CREATININE-BSD FMLA CKD-EPI: >60 MLS/MIN/1.73/M2
GLOBULIN SER-MCNC: 2.7 GM/DL (ref 2.4–3.5)
GLUCOSE SERPL-MCNC: 110 MG/DL (ref 74–100)
GLUCOSE UR QL STRIP.AUTO: NEGATIVE
HCT VFR BLD AUTO: 44.8 % (ref 42–52)
HDLC SERPL-MCNC: 29 MG/DL (ref 35–60)
HGB BLD-MCNC: 15.5 G/DL (ref 14–18)
IMM GRANULOCYTES # BLD AUTO: 0.02 X10(3)/MCL (ref 0–0.04)
IMM GRANULOCYTES NFR BLD AUTO: 0.4 %
KETONES UR QL STRIP.AUTO: NEGATIVE
LDLC SERPL CALC-MCNC: 100 MG/DL (ref 50–140)
LEUKOCYTE ESTERASE UR QL STRIP.AUTO: NEGATIVE
LYMPHOCYTES # BLD AUTO: 0.92 X10(3)/MCL (ref 0.6–4.6)
LYMPHOCYTES NFR BLD AUTO: 17.8 %
MCH RBC QN AUTO: 31.8 PG (ref 27–31)
MCHC RBC AUTO-ENTMCNC: 34.6 G/DL (ref 33–36)
MCV RBC AUTO: 92 FL (ref 80–94)
MONOCYTES # BLD AUTO: 0.49 X10(3)/MCL (ref 0.1–1.3)
MONOCYTES NFR BLD AUTO: 9.5 %
NEUTROPHILS # BLD AUTO: 3.42 X10(3)/MCL (ref 2.1–9.2)
NEUTROPHILS NFR BLD AUTO: 66.1 %
NITRITE UR QL STRIP.AUTO: NEGATIVE
NRBC BLD AUTO-RTO: 0 %
PH UR STRIP.AUTO: 6 [PH]
PLATELET # BLD AUTO: 193 X10(3)/MCL (ref 130–400)
PMV BLD AUTO: 10.7 FL (ref 7.4–10.4)
POTASSIUM SERPL-SCNC: 4 MMOL/L (ref 3.5–5.1)
PROT SERPL-MCNC: 6.7 GM/DL (ref 6.4–8.3)
PROT UR QL STRIP.AUTO: NEGATIVE
PSA SERPL-MCNC: 3.66 NG/ML
RBC # BLD AUTO: 4.87 X10(6)/MCL (ref 4.7–6.1)
RBC #/AREA URNS AUTO: <5 /HPF
RBC UR QL AUTO: NEGATIVE
SODIUM SERPL-SCNC: 141 MMOL/L (ref 136–145)
SP GR UR STRIP.AUTO: 1.02 (ref 1–1.03)
SQUAMOUS #/AREA URNS AUTO: <5 /HPF
TRIGL SERPL-MCNC: 289 MG/DL (ref 34–140)
TSH SERPL-ACNC: 1.46 UIU/ML (ref 0.35–4.94)
UROBILINOGEN UR STRIP-ACNC: 0.2
VLDLC SERPL CALC-MCNC: 58 MG/DL
WBC # SPEC AUTO: 5.17 X10(3)/MCL (ref 4.5–11.5)
WBC #/AREA URNS AUTO: <5 /HPF

## 2023-08-29 PROCEDURE — 80053 COMPREHEN METABOLIC PANEL: CPT

## 2023-08-29 PROCEDURE — 85025 COMPLETE CBC W/AUTO DIFF WBC: CPT

## 2023-08-29 PROCEDURE — 81001 URINALYSIS AUTO W/SCOPE: CPT

## 2023-08-29 PROCEDURE — 84443 ASSAY THYROID STIM HORMONE: CPT

## 2023-08-29 PROCEDURE — 80061 LIPID PANEL: CPT

## 2023-08-29 PROCEDURE — 36415 COLL VENOUS BLD VENIPUNCTURE: CPT

## 2023-08-29 PROCEDURE — 84153 ASSAY OF PSA TOTAL: CPT

## 2023-08-30 ENCOUNTER — OFFICE VISIT (OUTPATIENT)
Dept: INTERNAL MEDICINE | Facility: CLINIC | Age: 58
End: 2023-08-30
Payer: COMMERCIAL

## 2023-08-30 VITALS
OXYGEN SATURATION: 96 % | HEIGHT: 66 IN | SYSTOLIC BLOOD PRESSURE: 136 MMHG | RESPIRATION RATE: 18 BRPM | DIASTOLIC BLOOD PRESSURE: 84 MMHG | BODY MASS INDEX: 34.17 KG/M2 | HEART RATE: 90 BPM | WEIGHT: 212.63 LBS

## 2023-08-30 DIAGNOSIS — R93.89 ABNORMAL CT OF THE CHEST: ICD-10-CM

## 2023-08-30 DIAGNOSIS — E66.9 OBESITY, UNSPECIFIED CLASSIFICATION, UNSPECIFIED OBESITY TYPE, UNSPECIFIED WHETHER SERIOUS COMORBIDITY PRESENT: ICD-10-CM

## 2023-08-30 DIAGNOSIS — R91.1 SOLITARY PULMONARY NODULE: ICD-10-CM

## 2023-08-30 DIAGNOSIS — R97.20 INCREASED PROSTATE SPECIFIC ANTIGEN (PSA) VELOCITY: ICD-10-CM

## 2023-08-30 DIAGNOSIS — M10.9 GOUT, UNSPECIFIED CAUSE, UNSPECIFIED CHRONICITY, UNSPECIFIED SITE: ICD-10-CM

## 2023-08-30 DIAGNOSIS — E78.5 HYPERLIPIDEMIA, UNSPECIFIED HYPERLIPIDEMIA TYPE: ICD-10-CM

## 2023-08-30 DIAGNOSIS — K76.0 STEATOSIS OF LIVER: ICD-10-CM

## 2023-08-30 DIAGNOSIS — K57.92 DIVERTICULITIS: ICD-10-CM

## 2023-08-30 DIAGNOSIS — I10 HYPERTENSION, UNSPECIFIED TYPE: ICD-10-CM

## 2023-08-30 DIAGNOSIS — R73.9 HYPERGLYCEMIA: ICD-10-CM

## 2023-08-30 DIAGNOSIS — R79.89 AZOTEMIA: ICD-10-CM

## 2023-08-30 DIAGNOSIS — Z00.00 WELLNESS EXAMINATION: Primary | ICD-10-CM

## 2023-08-30 DIAGNOSIS — G47.33 OBSTRUCTIVE SLEEP APNEA SYNDROME: ICD-10-CM

## 2023-08-30 DIAGNOSIS — E04.2 GOITER, NONTOXIC, MULTINODULAR: ICD-10-CM

## 2023-08-30 PROCEDURE — 3008F PR BODY MASS INDEX (BMI) DOCUMENTED: ICD-10-PCS | Mod: CPTII,,, | Performed by: INTERNAL MEDICINE

## 2023-08-30 PROCEDURE — 3079F PR MOST RECENT DIASTOLIC BLOOD PRESSURE 80-89 MM HG: ICD-10-PCS | Mod: CPTII,,, | Performed by: INTERNAL MEDICINE

## 2023-08-30 PROCEDURE — 3075F SYST BP GE 130 - 139MM HG: CPT | Mod: CPTII,,, | Performed by: INTERNAL MEDICINE

## 2023-08-30 PROCEDURE — 99396 PR PREVENTIVE VISIT,EST,40-64: ICD-10-PCS | Mod: ,,, | Performed by: INTERNAL MEDICINE

## 2023-08-30 PROCEDURE — 1159F MED LIST DOCD IN RCRD: CPT | Mod: CPTII,,, | Performed by: INTERNAL MEDICINE

## 2023-08-30 PROCEDURE — 3008F BODY MASS INDEX DOCD: CPT | Mod: CPTII,,, | Performed by: INTERNAL MEDICINE

## 2023-08-30 PROCEDURE — 3075F PR MOST RECENT SYSTOLIC BLOOD PRESS GE 130-139MM HG: ICD-10-PCS | Mod: CPTII,,, | Performed by: INTERNAL MEDICINE

## 2023-08-30 PROCEDURE — 99396 PREV VISIT EST AGE 40-64: CPT | Mod: ,,, | Performed by: INTERNAL MEDICINE

## 2023-08-30 PROCEDURE — 3079F DIAST BP 80-89 MM HG: CPT | Mod: CPTII,,, | Performed by: INTERNAL MEDICINE

## 2023-08-30 PROCEDURE — 1159F PR MEDICATION LIST DOCUMENTED IN MEDICAL RECORD: ICD-10-PCS | Mod: CPTII,,, | Performed by: INTERNAL MEDICINE

## 2023-08-30 RX ORDER — LOSARTAN POTASSIUM AND HYDROCHLOROTHIAZIDE 12.5; 5 MG/1; MG/1
1 TABLET ORAL DAILY
Qty: 90 TABLET | Refills: 3 | Status: SHIPPED | OUTPATIENT
Start: 2023-08-30

## 2023-08-30 NOTE — PROGRESS NOTES
"Subjective:       Patient ID: Mariano Fontaine Jr. is a 57 y.o. male.    Chief Complaint: Annual Exam (Pt stated he went to [facility] ER a month ago due to a swollen throat, pt stated he was told it was his thyroid)      Is a 57-year-old man in for wellness check.  He feels fine overall.      He did have a problem with swelling in his neck about a month ago.  It was his 1st day of a new job.  He went to the walk-in clinic and was sent to the emergency room.  Evaluation there was unremarkable.  He had a CT of the neck that did show some incidental findings including multiple thyroid nodules and a few nodules that were small in both upper lobes of the lungs.  Radiologist recommended a CT scan of the chest in 1 year.  He also recommended a thyroid ultrasound.    Patient is a nonsmoker.  He was exposed to secondhand smoke as a kid.  His father  of lung cancer and was a smoker.    He has been walking 2 miles a day 4 days a week but stopped because of the heat but plans to start getting up early to do it again.      Review of Systems     Current Outpatient Medications on File Prior to Visit   Medication Sig Dispense Refill    [DISCONTINUED] losartan-hydrochlorothiazide 50-12.5 mg (HYZAAR) 50-12.5 mg per tablet Take 1 tablet by mouth once daily. 90 tablet 3    [DISCONTINUED] allopurinoL (ZYLOPRIM) 300 MG tablet Take 1 tablet (300 mg total) by mouth once daily. (Patient not taking: Reported on 2023) 30 tablet PRN    [DISCONTINUED] amoxicillin-clavulanate 875-125mg (AUGMENTIN) 875-125 mg per tablet Take 1 tablet by mouth every 12 (twelve) hours. (Patient not taking: Reported on 2023) 20 tablet 1     No current facility-administered medications on file prior to visit.     Objective:      /84 (BP Location: Right arm, Patient Position: Sitting, BP Method: Large (Manual))   Pulse 90   Resp 18   Ht 5' 6" (1.676 m)   Wt 96.4 kg (212 lb 9.6 oz)   SpO2 96%   BMI 34.31 kg/m²     Physical Exam  Vitals reviewed. "   Constitutional:       Appearance: Normal appearance.   HENT:      Head: Normocephalic and atraumatic.      Right Ear: Tympanic membrane normal.      Left Ear: Tympanic membrane normal.      Mouth/Throat:      Pharynx: Oropharynx is clear.   Eyes:      Pupils: Pupils are equal, round, and reactive to light.   Neck:      Vascular: No carotid bruit.   Cardiovascular:      Rate and Rhythm: Normal rate and regular rhythm.      Pulses: Normal pulses.      Heart sounds: Normal heart sounds.   Pulmonary:      Effort: Pulmonary effort is normal.      Breath sounds: Normal breath sounds.   Abdominal:      General: Abdomen is flat.      Palpations: Abdomen is soft. There is no mass.      Tenderness: There is no abdominal tenderness. There is no guarding.   Genitourinary:     Comments: Prostate medium-sized smooth without nodules.  Heme-negative stool.  Musculoskeletal:         General: No swelling.      Cervical back: Neck supple.   Lymphadenopathy:      Cervical: No cervical adenopathy.   Skin:     General: Skin is warm and dry.   Neurological:      General: No focal deficit present.      Mental Status: He is alert and oriented to person, place, and time.       Lab work reviewed  Assessment:       1. Wellness    2. Metabolic syndrome    3. Hypertension.  Controlled on current medicine    4. Hyperlipidemia.  Primarily high triglyceride and low HDL.  Worse    5. Hyperglycemia.  Also worse     6. Multiple small pulmonary nodule seen on CT neck.  Likely benign incidental findings     7. Multiple thyroid nodules noted on CT neck recently.  Incidental finding also.  Clinically and chemically euthyroid     8. Fatty liver.  Likely persist but liver enzymes remain negative     9. History of azotemia.  Resolved    10. Increased PSA velocity.    11. Gout.  No recurrence and not on meds    12. History of diverticulitis.  Currently stable    Plan:     Continue same meds but increased TLC.  Especially low carb weight reducing diet.   Warned about the risk of diabetes mellitus.    Follow-up with me 6 months with CMP lipid A1c and PSA    Check thyroid ultrasound for assessment of nodules and possible baseline study     Check CT chest in 11 months without contrast.  If stability noted at that time the no further studies will be needed

## 2023-09-08 ENCOUNTER — HOSPITAL ENCOUNTER (OUTPATIENT)
Dept: RADIOLOGY | Facility: HOSPITAL | Age: 58
Discharge: HOME | End: 2023-09-08
Attending: INTERNAL MEDICINE
Payer: COMMERCIAL

## 2023-09-08 DIAGNOSIS — R91.1 SOLITARY PULMONARY NODULE: ICD-10-CM

## 2023-09-08 DIAGNOSIS — E04.2 GOITER, NONTOXIC, MULTINODULAR: ICD-10-CM

## 2023-09-08 PROCEDURE — 71250 CT THORAX DX C-: CPT | Mod: TC

## 2023-09-08 PROCEDURE — 76536 US EXAM OF HEAD AND NECK: CPT | Mod: TC

## 2023-09-11 DIAGNOSIS — R91.1 SOLITARY PULMONARY NODULE: ICD-10-CM

## 2023-09-11 DIAGNOSIS — E04.2 MULTIPLE THYROID NODULES: ICD-10-CM

## 2023-09-11 DIAGNOSIS — R91.8 MULTIPLE PULMONARY NODULES: Primary | ICD-10-CM

## 2023-09-12 ENCOUNTER — TELEPHONE (OUTPATIENT)
Dept: INTERNAL MEDICINE | Facility: CLINIC | Age: 58
End: 2023-09-12
Payer: COMMERCIAL

## 2023-09-12 NOTE — TELEPHONE ENCOUNTER
----- Message from Farrah Dominguez sent at 9/12/2023  8:35 AM CDT -----  Regarding: call back  .Type:  Patient Returning Call    Who Called:gail  Who Left Message for Patient:  Does the patient know what this is regarding? ent  Would the patient rather a call back or a response via [facility]?   Best Call Back Number:[phone]  Additional Information: pt states he missed a call

## 2023-09-12 NOTE — TELEPHONE ENCOUNTER
Attempted to contact pt back at home and cell number was unsuccessful will attempt to contact pt back.

## 2023-09-13 ENCOUNTER — TELEPHONE (OUTPATIENT)
Dept: INTERNAL MEDICINE | Facility: CLINIC | Age: 58
End: 2023-09-13
Payer: COMMERCIAL

## 2023-09-13 DIAGNOSIS — R91.8 MULTIPLE PULMONARY NODULES: ICD-10-CM

## 2023-09-13 DIAGNOSIS — E04.2 MULTIPLE THYROID NODULES: Primary | ICD-10-CM

## 2023-09-13 NOTE — TELEPHONE ENCOUNTER
----- Message from Orlando Quevedo sent at 9/13/2023  1:30 PM CDT -----  .Type:  Patient Returning Call    Who Called:pt  Who Left Message for Patient:  Does the patient know what this is regarding?:reference for ENT Dr. Ania Rodriguez   Would the patient rather a call back or a response via [facility]? Call back   Best Call Back Number:[phone]  Additional Information: Called back line no answer

## 2023-09-27 ENCOUNTER — TELEPHONE (OUTPATIENT)
Dept: INTERNAL MEDICINE | Facility: CLINIC | Age: 58
End: 2023-09-27
Payer: COMMERCIAL

## 2023-09-27 NOTE — TELEPHONE ENCOUNTER
----- Message from Berkley Monaco sent at 9/27/2023 11:29 AM CDT -----  Regarding: referral  Type:  Patient Requesting Referral    Who Called:pt's wife    Referral to What Specialty:ENT   Reason for Referral:thyroid  Does the patient want the referral with a specific physician?:Dr Ania Rodriguez    Would the patient rather a call back or a response via [facility]? C/b   Best Call Back Number:[phone]    Additional Information: pt s/w Dr Brumfield office and they never rcvd the referral, please refax to # [phone]

## 2023-09-28 ENCOUNTER — TELEPHONE (OUTPATIENT)
Dept: INTERNAL MEDICINE | Facility: CLINIC | Age: 58
End: 2023-09-28
Payer: COMMERCIAL

## 2023-09-28 NOTE — TELEPHONE ENCOUNTER
----- Message from Karen Raymond sent at 9/28/2023  3:20 PM CDT -----    Lisa Fontaine  called to requesting  recent blood work and ultrasound results be sent over to Dr Shelly Rodriguez office.  She stated the office had just faxed over a referral to the Dr Rodriguez. Dr Rodriguez office stated they need recent blood work and ultrasound results.        call back # is [phone]

## 2023-12-11 ENCOUNTER — TELEPHONE (OUTPATIENT)
Dept: INTERNAL MEDICINE | Facility: CLINIC | Age: 58
End: 2023-12-11
Payer: COMMERCIAL

## 2024-02-26 ENCOUNTER — TELEPHONE (OUTPATIENT)
Dept: INTERNAL MEDICINE | Facility: CLINIC | Age: 59
End: 2024-02-26
Payer: COMMERCIAL

## 2024-02-26 NOTE — TELEPHONE ENCOUNTER
----- Message from Neeta Meier LPN sent at 2/26/2024  1:15 PM CST -----  Regarding: elijah Senior 3/4 @9:40  Fasting labs needed.

## 2024-08-27 ENCOUNTER — TELEPHONE (OUTPATIENT)
Dept: INTERNAL MEDICINE | Facility: CLINIC | Age: 59
End: 2024-08-27
Payer: COMMERCIAL

## 2024-08-27 NOTE — TELEPHONE ENCOUNTER
----- Message from Neeta Meier LPN sent at 8/26/2024 11:46 AM CDT -----  Regarding: elijah Senior 09/04 @9:20  Fasting labs needed.

## 2024-09-04 ENCOUNTER — OFFICE VISIT (OUTPATIENT)
Dept: INTERNAL MEDICINE | Facility: CLINIC | Age: 59
End: 2024-09-04
Payer: COMMERCIAL

## 2024-09-04 ENCOUNTER — LAB VISIT (OUTPATIENT)
Dept: LAB | Facility: HOSPITAL | Age: 59
End: 2024-09-04
Attending: INTERNAL MEDICINE
Payer: COMMERCIAL

## 2024-09-04 VITALS
OXYGEN SATURATION: 97 % | HEART RATE: 77 BPM | SYSTOLIC BLOOD PRESSURE: 130 MMHG | RESPIRATION RATE: 18 BRPM | WEIGHT: 216.19 LBS | BODY MASS INDEX: 34.74 KG/M2 | HEIGHT: 66 IN | DIASTOLIC BLOOD PRESSURE: 76 MMHG

## 2024-09-04 DIAGNOSIS — M10.9 GOUT, UNSPECIFIED CAUSE, UNSPECIFIED CHRONICITY, UNSPECIFIED SITE: ICD-10-CM

## 2024-09-04 DIAGNOSIS — I10 HYPERTENSION, UNSPECIFIED TYPE: ICD-10-CM

## 2024-09-04 DIAGNOSIS — Z00.00 WELLNESS EXAMINATION: Primary | ICD-10-CM

## 2024-09-04 DIAGNOSIS — G47.33 OBSTRUCTIVE SLEEP APNEA SYNDROME: ICD-10-CM

## 2024-09-04 DIAGNOSIS — E04.2 MULTIPLE THYROID NODULES: ICD-10-CM

## 2024-09-04 DIAGNOSIS — E78.5 HYPERLIPIDEMIA, UNSPECIFIED HYPERLIPIDEMIA TYPE: ICD-10-CM

## 2024-09-04 DIAGNOSIS — E04.2 GOITER, NONTOXIC, MULTINODULAR: ICD-10-CM

## 2024-09-04 DIAGNOSIS — K76.0 STEATOSIS OF LIVER: ICD-10-CM

## 2024-09-04 LAB — URATE SERPL-MCNC: 9.5 MG/DL (ref 3.5–7.2)

## 2024-09-04 PROCEDURE — 99396 PREV VISIT EST AGE 40-64: CPT | Mod: ,,, | Performed by: INTERNAL MEDICINE

## 2024-09-04 PROCEDURE — 84550 ASSAY OF BLOOD/URIC ACID: CPT

## 2024-09-04 PROCEDURE — 1159F MED LIST DOCD IN RCRD: CPT | Mod: CPTII,,, | Performed by: INTERNAL MEDICINE

## 2024-09-04 PROCEDURE — 3075F SYST BP GE 130 - 139MM HG: CPT | Mod: CPTII,,, | Performed by: INTERNAL MEDICINE

## 2024-09-04 PROCEDURE — 3078F DIAST BP <80 MM HG: CPT | Mod: CPTII,,, | Performed by: INTERNAL MEDICINE

## 2024-09-04 PROCEDURE — 36415 COLL VENOUS BLD VENIPUNCTURE: CPT

## 2024-09-04 PROCEDURE — 3008F BODY MASS INDEX DOCD: CPT | Mod: CPTII,,, | Performed by: INTERNAL MEDICINE

## 2024-09-04 RX ORDER — LOSARTAN POTASSIUM 100 MG/1
100 TABLET ORAL DAILY
Qty: 90 TABLET | Refills: 3 | Status: SHIPPED | OUTPATIENT
Start: 2024-09-04 | End: 2025-09-04

## 2024-09-04 RX ORDER — INDOMETHACIN 25 MG/1
25 CAPSULE ORAL 3 TIMES DAILY PRN
Qty: 30 CAPSULE | Refills: 1 | Status: SHIPPED | OUTPATIENT
Start: 2024-09-04

## 2024-09-04 NOTE — PROGRESS NOTES
"Patient ID: [id number]      Subjective:     Chief Complaint: Annual Exam (Pt is having a gout flare-up, he stated it started after eating a lot of seafood over the weekend)      Mariano Fontaine Jr. is a 58 y.o. male.  Is a 58-year-old man in for wellness.  Blood work not done except for uric acid as there were some scheduling issues.  He did not keep his six-month follow-up.  The only thing he had drawn was a uric acid.    He has been feeling well except for recent foot pain.  First in the great toe right foot then that cleared up spontaneously and then the left foot starting to hurt between the 2nd and 3rd toes.    Does have a history of gout and a high uric acid level and has been placed on allopurinol in the past.  He was also on blood pressure meds that includes a low-dose of hydrochlorothiazide.    He did see the ENT doctor for the thyroid nodules.  He was saw Dr. Massey say.  She evaluated him for allergies but did not really recommend any further testing or biopsies on the thyroid.  He decided to quit going.  If he does not need another ENT doctor he was like someone else.        Review of Systems    Outpatient Medications Marked as Taking for the 9/4/24 encounter (Office Visit) with Kevin Carmen MD   Medication Sig Dispense Refill    [DISCONTINUED] losartan-hydrochlorothiazide 50-12.5 mg (HYZAAR) 50-12.5 mg per tablet Take 1 tablet by mouth once daily. 90 tablet 3       Objective:     /76 (BP Location: Right arm, Patient Position: Sitting, BP Method: Large (Manual))   Pulse 77   Resp 18   Ht 5' 6" (1.676 m)   Wt 98.1 kg (216 lb 3.2 oz)   SpO2 97%   BMI 34.90 kg/m²     Physical Exam  Vitals reviewed.   Constitutional:       Appearance: Normal appearance.   HENT:      Head: Normocephalic and atraumatic.      Right Ear: Tympanic membrane normal.      Left Ear: Tympanic membrane normal.      Mouth/Throat:      Pharynx: Oropharynx is clear.   Eyes:      Pupils: Pupils are equal, round, and reactive " to light.   Neck:      Vascular: No carotid bruit.      Comments: Thyroid is generally full and somewhat irregular, especially left lobe  Cardiovascular:      Rate and Rhythm: Normal rate and regular rhythm.      Pulses: Normal pulses.      Heart sounds: Normal heart sounds.   Pulmonary:      Effort: Pulmonary effort is normal.      Breath sounds: Normal breath sounds.   Abdominal:      General: Abdomen is flat.      Palpations: Abdomen is soft. There is no mass.      Tenderness: There is no abdominal tenderness. There is no guarding.   Musculoskeletal:         General: No swelling.      Cervical back: Neck supple.   Lymphadenopathy:      Cervical: No cervical adenopathy.   Skin:     General: Skin is warm and dry.   Neurological:      General: No focal deficit present.      Mental Status: He is alert and oriented to person, place, and time.     Uric acid elevated    Assessment:     1. Wellness     2. Gout.  Perhaps he was do better without an HCTZ.  I was considering resuming the allopurinol but will hold off on that idea for now.      3. Hypertension.  Controlled on Hyzaar     4. Hyperlipidemia.  Status unknown has been having done repeat blood     5. Hyperglycemia.  Also an issue in the past     6. History of obstructive sleep apnea on CPAP    7. Multiple thyroid nodules.  Need to keep an eye on those nodules., biopsy maybe necessary     8. History of pulmonary nodules.  Essentially resolved with repeat CT scanning.  An incidental finding.          Plan:   Plan on doing his wellness lab work in about 6 weeks along with a repeat uric acid.  Hopefully that level we will come down the gout attack will subside.  Will also give him Indocin 25 t.i.d. p.r.n. gout pain.  Follow-up with me 6 weeks.  Repeat thyroid ultrasound between now and next visit.  Problem List Items Addressed This Visit          Cardiac/Vascular    Hyperlipidemia    Relevant Orders    CBC Auto Differential    Comprehensive Metabolic Panel    Lipid  Panel    TSH    Hemoglobin A1C    PSA, Screening    Uric Acid       Orthopedic    Gout    Relevant Orders    CBC Auto Differential    Comprehensive Metabolic Panel    Lipid Panel    TSH    Hemoglobin A1C    PSA, Screening    Uric Acid       Other    Obstructive sleep apnea syndrome    Relevant Orders    CBC Auto Differential    Comprehensive Metabolic Panel    Lipid Panel    TSH    Hemoglobin A1C    PSA, Screening    Uric Acid     Other Visit Diagnoses       Wellness examination    -  Primary    Relevant Orders    CBC Auto Differential    Comprehensive Metabolic Panel    Lipid Panel    TSH    Hemoglobin A1C    PSA, Screening    Uric Acid    Multiple thyroid nodules        Relevant Orders    US Thyroid    CBC Auto Differential    Comprehensive Metabolic Panel    Lipid Panel    TSH    Hemoglobin A1C    PSA, Screening    Uric Acid    Hypertension, unspecified type        Relevant Orders    CBC Auto Differential    Comprehensive Metabolic Panel    Lipid Panel    TSH    Hemoglobin A1C    PSA, Screening    Uric Acid             Orders Placed This Encounter   Procedures    US Thyroid     Standing Status:   Future     Standing Expiration Date:   9/4/2025     Order Specific Question:   May the Radiologist modify the order per protocol to meet the clinical needs of the patient?     Answer:   Yes     Order Specific Question:   Release to patient     Answer:   Immediate    CBC Auto Differential     Standing Status:   Future     Standing Expiration Date:   9/4/2025    Comprehensive Metabolic Panel     Standing Status:   Future     Standing Expiration Date:   9/4/2025    Lipid Panel     Standing Status:   Future     Standing Expiration Date:   9/4/2025    TSH     Standing Status:   Future     Standing Expiration Date:   9/4/2025    Hemoglobin A1C     Standing Status:   Future     Standing Expiration Date:   9/4/2025    PSA, Screening     Standing Status:   Future     Standing Expiration Date:   9/4/2025    Uric Acid     Standing  Status:   Future     Standing Expiration Date:   11/3/2025        Medication List with Changes/Refills   New Medications    INDOMETHACIN (INDOCIN) 25 MG CAPSULE    Take 1 capsule (25 mg total) by mouth 3 (three) times daily as needed (gout).       Start Date: 9/4/2024  End Date: --    LOSARTAN (COZAAR) 100 MG TABLET    Take 1 tablet (100 mg total) by mouth once daily.       Start Date: 9/4/2024  End Date: 9/4/2025   Discontinued Medications    LOSARTAN-HYDROCHLOROTHIAZIDE 50-12.5 MG (HYZAAR) 50-12.5 MG PER TABLET    Take 1 tablet by mouth once daily.       Start Date: 8/30/2023 End Date: 9/4/2024            No follow-ups on file. In addition to their scheduled follow up, the patient has also been instructed to follow up on as needed basis.       Kevin Carmen

## 2024-10-08 ENCOUNTER — TELEPHONE (OUTPATIENT)
Dept: INTERNAL MEDICINE | Facility: CLINIC | Age: 59
End: 2024-10-08
Payer: COMMERCIAL

## 2024-10-08 ENCOUNTER — HOSPITAL ENCOUNTER (OUTPATIENT)
Dept: RADIOLOGY | Facility: HOSPITAL | Age: 59
Discharge: HOME | End: 2024-10-08
Attending: INTERNAL MEDICINE
Payer: COMMERCIAL

## 2024-10-08 DIAGNOSIS — E04.2 MULTIPLE THYROID NODULES: ICD-10-CM

## 2024-10-08 PROCEDURE — 76536 US EXAM OF HEAD AND NECK: CPT | Mod: TC

## 2024-10-08 NOTE — TELEPHONE ENCOUNTER
----- Message from Nurse Neeta sent at 10/8/2024 12:48 PM CDT -----  Regarding: elijah Senior 10/15 @2:00  Fasting labs needed.

## 2024-10-15 ENCOUNTER — OFFICE VISIT (OUTPATIENT)
Dept: INTERNAL MEDICINE | Facility: CLINIC | Age: 59
End: 2024-10-15
Payer: COMMERCIAL

## 2024-10-15 ENCOUNTER — LAB VISIT (OUTPATIENT)
Dept: LAB | Facility: HOSPITAL | Age: 59
End: 2024-10-15
Attending: INTERNAL MEDICINE
Payer: COMMERCIAL

## 2024-10-15 VITALS
BODY MASS INDEX: 34.23 KG/M2 | RESPIRATION RATE: 18 BRPM | WEIGHT: 213 LBS | HEIGHT: 66 IN | DIASTOLIC BLOOD PRESSURE: 84 MMHG | SYSTOLIC BLOOD PRESSURE: 122 MMHG | HEART RATE: 63 BPM | OXYGEN SATURATION: 96 %

## 2024-10-15 DIAGNOSIS — E78.5 HYPERLIPIDEMIA, UNSPECIFIED HYPERLIPIDEMIA TYPE: ICD-10-CM

## 2024-10-15 DIAGNOSIS — M10.9 GOUT, UNSPECIFIED CAUSE, UNSPECIFIED CHRONICITY, UNSPECIFIED SITE: ICD-10-CM

## 2024-10-15 DIAGNOSIS — E04.2 MULTIPLE THYROID NODULES: ICD-10-CM

## 2024-10-15 DIAGNOSIS — G47.33 OBSTRUCTIVE SLEEP APNEA SYNDROME: ICD-10-CM

## 2024-10-15 DIAGNOSIS — E04.2 MULTIPLE THYROID NODULES: Primary | ICD-10-CM

## 2024-10-15 DIAGNOSIS — Z00.00 WELLNESS EXAMINATION: ICD-10-CM

## 2024-10-15 DIAGNOSIS — I10 HYPERTENSION, UNSPECIFIED TYPE: ICD-10-CM

## 2024-10-15 LAB
ALBUMIN SERPL-MCNC: 3.8 G/DL (ref 3.5–5)
ALBUMIN/GLOB SERPL: 1.3 RATIO (ref 1.1–2)
ALP SERPL-CCNC: 106 UNIT/L (ref 40–150)
ALT SERPL-CCNC: 34 UNIT/L (ref 0–55)
ANION GAP SERPL CALC-SCNC: 7 MEQ/L
AST SERPL-CCNC: 24 UNIT/L (ref 5–34)
BASOPHILS # BLD AUTO: 0.05 X10(3)/MCL
BASOPHILS NFR BLD AUTO: 0.8 %
BILIRUB SERPL-MCNC: 0.7 MG/DL
BUN SERPL-MCNC: 11.5 MG/DL (ref 8.4–25.7)
CALCIUM SERPL-MCNC: 9.3 MG/DL (ref 8.4–10.2)
CHLORIDE SERPL-SCNC: 109 MMOL/L (ref 98–107)
CHOLEST SERPL-MCNC: 165 MG/DL
CHOLEST/HDLC SERPL: 5 {RATIO} (ref 0–5)
CO2 SERPL-SCNC: 27 MMOL/L (ref 22–29)
CREAT SERPL-MCNC: 1.24 MG/DL (ref 0.73–1.18)
CREAT/UREA NIT SERPL: 9
EOSINOPHIL # BLD AUTO: 0.26 X10(3)/MCL (ref 0–0.9)
EOSINOPHIL NFR BLD AUTO: 4.3 %
ERYTHROCYTE [DISTWIDTH] IN BLOOD BY AUTOMATED COUNT: 12.2 % (ref 11.5–17)
EST. AVERAGE GLUCOSE BLD GHB EST-MCNC: 111.2 MG/DL
GFR SERPLBLD CREATININE-BSD FMLA CKD-EPI: >60 ML/MIN/1.73/M2
GLOBULIN SER-MCNC: 2.9 GM/DL (ref 2.4–3.5)
GLUCOSE SERPL-MCNC: 111 MG/DL (ref 74–100)
HBA1C MFR BLD: 5.5 %
HCT VFR BLD AUTO: 42.4 % (ref 42–52)
HDLC SERPL-MCNC: 31 MG/DL (ref 35–60)
HGB BLD-MCNC: 14.6 G/DL (ref 14–18)
IMM GRANULOCYTES # BLD AUTO: 0.02 X10(3)/MCL (ref 0–0.04)
IMM GRANULOCYTES NFR BLD AUTO: 0.3 %
LDLC SERPL CALC-MCNC: 101 MG/DL (ref 50–140)
LYMPHOCYTES # BLD AUTO: 1.32 X10(3)/MCL (ref 0.6–4.6)
LYMPHOCYTES NFR BLD AUTO: 21.9 %
MCH RBC QN AUTO: 31.9 PG (ref 27–31)
MCHC RBC AUTO-ENTMCNC: 34.4 G/DL (ref 33–36)
MCV RBC AUTO: 92.6 FL (ref 80–94)
MONOCYTES # BLD AUTO: 0.43 X10(3)/MCL (ref 0.1–1.3)
MONOCYTES NFR BLD AUTO: 7.1 %
NEUTROPHILS # BLD AUTO: 3.96 X10(3)/MCL (ref 2.1–9.2)
NEUTROPHILS NFR BLD AUTO: 65.6 %
NRBC BLD AUTO-RTO: 0 %
PLATELET # BLD AUTO: 199 X10(3)/MCL (ref 130–400)
PMV BLD AUTO: 10.3 FL (ref 7.4–10.4)
POTASSIUM SERPL-SCNC: 4.7 MMOL/L (ref 3.5–5.1)
PROT SERPL-MCNC: 6.7 GM/DL (ref 6.4–8.3)
PSA SERPL-MCNC: 3.08 NG/ML
RBC # BLD AUTO: 4.58 X10(6)/MCL (ref 4.7–6.1)
SODIUM SERPL-SCNC: 143 MMOL/L (ref 136–145)
TRIGL SERPL-MCNC: 166 MG/DL (ref 34–140)
TSH SERPL-ACNC: 1.85 UIU/ML (ref 0.35–4.94)
URATE SERPL-MCNC: 7.7 MG/DL (ref 3.5–7.2)
VLDLC SERPL CALC-MCNC: 33 MG/DL
WBC # BLD AUTO: 6.04 X10(3)/MCL (ref 4.5–11.5)

## 2024-10-15 PROCEDURE — 36415 COLL VENOUS BLD VENIPUNCTURE: CPT

## 2024-10-15 PROCEDURE — 80061 LIPID PANEL: CPT

## 2024-10-15 PROCEDURE — 1159F MED LIST DOCD IN RCRD: CPT | Mod: CPTII,,, | Performed by: INTERNAL MEDICINE

## 2024-10-15 PROCEDURE — 84550 ASSAY OF BLOOD/URIC ACID: CPT

## 2024-10-15 PROCEDURE — 85025 COMPLETE CBC W/AUTO DIFF WBC: CPT

## 2024-10-15 PROCEDURE — 84153 ASSAY OF PSA TOTAL: CPT

## 2024-10-15 PROCEDURE — 80053 COMPREHEN METABOLIC PANEL: CPT

## 2024-10-15 PROCEDURE — 3008F BODY MASS INDEX DOCD: CPT | Mod: CPTII,,, | Performed by: INTERNAL MEDICINE

## 2024-10-15 PROCEDURE — 3074F SYST BP LT 130 MM HG: CPT | Mod: CPTII,,, | Performed by: INTERNAL MEDICINE

## 2024-10-15 PROCEDURE — 99213 OFFICE O/P EST LOW 20 MIN: CPT | Mod: ,,, | Performed by: INTERNAL MEDICINE

## 2024-10-15 PROCEDURE — 84443 ASSAY THYROID STIM HORMONE: CPT

## 2024-10-15 PROCEDURE — 3044F HG A1C LEVEL LT 7.0%: CPT | Mod: CPTII,,, | Performed by: INTERNAL MEDICINE

## 2024-10-15 PROCEDURE — 3079F DIAST BP 80-89 MM HG: CPT | Mod: CPTII,,, | Performed by: INTERNAL MEDICINE

## 2024-10-15 PROCEDURE — 83036 HEMOGLOBIN GLYCOSYLATED A1C: CPT

## 2024-10-15 PROCEDURE — 4010F ACE/ARB THERAPY RXD/TAKEN: CPT | Mod: CPTII,,, | Performed by: INTERNAL MEDICINE

## 2024-10-15 RX ORDER — ALLOPURINOL 300 MG/1
300 TABLET ORAL DAILY
Qty: 90 TABLET | Refills: 3 | Status: SHIPPED | OUTPATIENT
Start: 2024-10-15

## 2024-10-15 NOTE — PROGRESS NOTES
"Patient ID: [id number]      Subjective:     Chief Complaint: Follow-up      Mariano Fontaine Jr. is a 59 y.o. male.  Is a 59-year-old man in for follow-up of multiple medical problems.  When he was here 6 weeks ago he was getting over a gout attack.  He was uric acid was quite high.  We put him on Indocin and we got him off the HCTZ.  He also has a repeat thyroid ultrasound because it was time to re study that.  He does have a history of multinodular goiter.    Follow-up        Review of Systems    Outpatient Medications Marked as Taking for the 10/15/24 encounter (Office Visit) with Kevin Carmen MD   Medication Sig Dispense Refill    indomethacin (INDOCIN) 25 MG capsule Take 1 capsule (25 mg total) by mouth 3 (three) times daily as needed (gout). 30 capsule 1    losartan (COZAAR) 100 MG tablet Take 1 tablet (100 mg total) by mouth once daily. 90 tablet 3       Objective:     /84   Pulse 63   Resp 18   Ht 5' 6" (1.676 m)   Wt 96.6 kg (213 lb)   SpO2 96%   BMI 34.38 kg/m²     Physical Exam  Vitals reviewed.   Constitutional:       Appearance: Normal appearance.   HENT:      Head: Normocephalic and atraumatic.      Mouth/Throat:      Pharynx: Oropharynx is clear.   Eyes:      Pupils: Pupils are equal, round, and reactive to light.   Cardiovascular:      Rate and Rhythm: Normal rate and regular rhythm.      Pulses: Normal pulses.      Heart sounds: Normal heart sounds.   Pulmonary:      Breath sounds: Normal breath sounds.   Abdominal:      General: Abdomen is flat.      Palpations: Abdomen is soft.   Musculoskeletal:      Cervical back: Neck supple.   Skin:     General: Skin is warm and dry.   Neurological:      Mental Status: He is alert.       Lab work reviewed and ultrasound thyroid reviewed  Assessment:    1. Multiple thyroid nodules.  The most concerning 1 is 1.7 cm in size on the left side inferior lobe.  Last year it measured 1.6 cm.  Discussions regarding referral for needle biopsy versus " continued observation.  He chose latter.      2. Gout.  Improved clinically.  Still some tenderness.  Uric acid is improved but still elevated.    3. Hypertension remains controlled on losartan without diuretic component     4. Hyperlipidemia.  Stable     5. Hyperglycemia.  Stable    6. History of pulmonary nodules.  Incidental finding.  No further scans needed.      Plan:   Continue current meds with the addition of allopurinol 300 daily.  Follow-up with me 6 months with CBC CMP lipid TSH uric acid prior.  Will plan on repeating ultrasound 1 year from the last study  Problem List Items Addressed This Visit          Cardiac/Vascular    Hyperlipidemia    Relevant Orders    CBC Auto Differential    Comprehensive Metabolic Panel    Lipid Panel    Uric Acid    TSH       Orthopedic    Gout    Relevant Orders    CBC Auto Differential    Comprehensive Metabolic Panel    Lipid Panel    Uric Acid    TSH     Other Visit Diagnoses       Multiple thyroid nodules    -  Primary    Relevant Orders    CBC Auto Differential    Comprehensive Metabolic Panel    Lipid Panel    Uric Acid    TSH    Hypertension, unspecified type        Relevant Orders    CBC Auto Differential    Comprehensive Metabolic Panel    Lipid Panel    Uric Acid    TSH             Orders Placed This Encounter   Procedures    CBC Auto Differential     Standing Status:   Future     Standing Expiration Date:   10/15/2025    Comprehensive Metabolic Panel     Standing Status:   Future     Standing Expiration Date:   10/15/2025    Lipid Panel     Standing Status:   Future     Standing Expiration Date:   10/15/2025    Uric Acid     Standing Status:   Future     Standing Expiration Date:   12/14/2025    TSH     Standing Status:   Future     Standing Expiration Date:   10/15/2025        Medication List with Changes/Refills   New Medications    ALLOPURINOL (ZYLOPRIM) 300 MG TABLET    Take 1 tablet (300 mg total) by mouth once daily.       Start Date: 10/15/2024End Date: --    Current Medications    INDOMETHACIN (INDOCIN) 25 MG CAPSULE    Take 1 capsule (25 mg total) by mouth 3 (three) times daily as needed (gout).       Start Date: 9/4/2024  End Date: --    LOSARTAN (COZAAR) 100 MG TABLET    Take 1 tablet (100 mg total) by mouth once daily.       Start Date: 9/4/2024  End Date: 9/4/2025            No follow-ups on file. In addition to their scheduled follow up, the patient has also been instructed to follow up on as needed basis.       Kevin Carmen

## 2024-11-26 ENCOUNTER — TELEPHONE (OUTPATIENT)
Dept: INTERNAL MEDICINE | Facility: CLINIC | Age: 59
End: 2024-11-26
Payer: COMMERCIAL

## 2024-11-26 NOTE — TELEPHONE ENCOUNTER
Patient stated he has been watching his diet but is still having reoccuring occasional gout symptoms.  Patient stated his BP medication was changed and he is still having intermittent gout flareups.  Patient stated the symptoms occur about once a month, alternating between feet.  Patient stated at his October appt with Dr. Carmen he was put on Gout medication but that has not helped.  Patient stated he woke up this morning with left foot pain (two middle toes).  Patient took Advil at 0600 and at 0830 feeling slight relief.  Please advise.

## 2024-11-26 NOTE — TELEPHONE ENCOUNTER
----- Message from Handup sent at 11/26/2024  8:13 AM CST -----  Who Called: Mariano Fontaine Jr.    Caller is requesting assistance/information from provider's office.    Symptoms (please be specific): gout symptoms possibly from medication: losartan (COZAAR) 100 MG tablet, pt stated not sure   How long has patient had these symptoms:  last appt 10/15/24  List of preferred pharmacies on file (remove unneeded): [unfilled]  If different, enter pharmacy into here including location and phone number: n/a      Preferred Method of Contact: Phone Call  Patient's Preferred Phone Number on File: [phone]   Best Call Back Number, if different:  Additional Information: medical advice, pt called to discuss symptoms above, please advise, thanks

## 2025-04-09 ENCOUNTER — TELEPHONE (OUTPATIENT)
Dept: INTERNAL MEDICINE | Facility: CLINIC | Age: 60
End: 2025-04-09
Payer: COMMERCIAL

## 2025-04-09 NOTE — TELEPHONE ENCOUNTER
----- Message from Nurse Neeta sent at 4/9/2025 10:46 AM CDT -----  Regarding: elijah Senior 04/16 @9:40  Fasting labs needed.

## 2025-05-06 ENCOUNTER — TELEPHONE (OUTPATIENT)
Dept: INTERNAL MEDICINE | Facility: CLINIC | Age: 60
End: 2025-05-06
Payer: COMMERCIAL

## 2025-05-06 NOTE — TELEPHONE ENCOUNTER
----- Message from Nurse Neeta sent at 5/6/2025  8:31 AM CDT -----  Regarding: elijah Senior 05/13 @9:00  Fasting labs needed.

## 2025-05-12 ENCOUNTER — LAB VISIT (OUTPATIENT)
Dept: LAB | Facility: HOSPITAL | Age: 60
End: 2025-05-12
Attending: INTERNAL MEDICINE
Payer: COMMERCIAL

## 2025-05-12 DIAGNOSIS — I10 HYPERTENSION, UNSPECIFIED TYPE: ICD-10-CM

## 2025-05-12 DIAGNOSIS — M10.9 GOUT, UNSPECIFIED CAUSE, UNSPECIFIED CHRONICITY, UNSPECIFIED SITE: ICD-10-CM

## 2025-05-12 DIAGNOSIS — E78.5 HYPERLIPIDEMIA, UNSPECIFIED HYPERLIPIDEMIA TYPE: ICD-10-CM

## 2025-05-12 DIAGNOSIS — E04.2 MULTIPLE THYROID NODULES: ICD-10-CM

## 2025-05-12 LAB
ALBUMIN SERPL-MCNC: 4.1 G/DL (ref 3.5–5)
ALBUMIN/GLOB SERPL: 1.4 RATIO (ref 1.1–2)
ALP SERPL-CCNC: 126 UNIT/L (ref 40–150)
ALT SERPL-CCNC: 50 UNIT/L (ref 0–55)
ANION GAP SERPL CALC-SCNC: 8 MEQ/L
AST SERPL-CCNC: 26 UNIT/L (ref 11–45)
BASOPHILS # BLD AUTO: 0.06 X10(3)/MCL
BASOPHILS NFR BLD AUTO: 1 %
BILIRUB SERPL-MCNC: 1 MG/DL
BUN SERPL-MCNC: 10.7 MG/DL (ref 8.4–25.7)
CALCIUM SERPL-MCNC: 9.3 MG/DL (ref 8.4–10.2)
CHLORIDE SERPL-SCNC: 109 MMOL/L (ref 98–107)
CHOLEST SERPL-MCNC: 168 MG/DL
CHOLEST/HDLC SERPL: 7 {RATIO} (ref 0–5)
CO2 SERPL-SCNC: 26 MMOL/L (ref 22–29)
CREAT SERPL-MCNC: 1.23 MG/DL (ref 0.72–1.25)
CREAT/UREA NIT SERPL: 9
EOSINOPHIL # BLD AUTO: 0.17 X10(3)/MCL (ref 0–0.9)
EOSINOPHIL NFR BLD AUTO: 2.8 %
ERYTHROCYTE [DISTWIDTH] IN BLOOD BY AUTOMATED COUNT: 12.4 % (ref 11.5–17)
GFR SERPLBLD CREATININE-BSD FMLA CKD-EPI: >60 ML/MIN/1.73/M2
GLOBULIN SER-MCNC: 3 GM/DL (ref 2.4–3.5)
GLUCOSE SERPL-MCNC: 97 MG/DL (ref 74–100)
HCT VFR BLD AUTO: 47.5 % (ref 42–52)
HDLC SERPL-MCNC: 24 MG/DL (ref 35–60)
HGB BLD-MCNC: 16.1 G/DL (ref 14–18)
IMM GRANULOCYTES # BLD AUTO: 0.01 X10(3)/MCL (ref 0–0.04)
IMM GRANULOCYTES NFR BLD AUTO: 0.2 %
LDLC SERPL CALC-MCNC: 118 MG/DL (ref 50–140)
LYMPHOCYTES # BLD AUTO: 1.54 X10(3)/MCL (ref 0.6–4.6)
LYMPHOCYTES NFR BLD AUTO: 25.5 %
MCH RBC QN AUTO: 31.4 PG (ref 27–31)
MCHC RBC AUTO-ENTMCNC: 33.9 G/DL (ref 33–36)
MCV RBC AUTO: 92.8 FL (ref 80–94)
MONOCYTES # BLD AUTO: 0.42 X10(3)/MCL (ref 0.1–1.3)
MONOCYTES NFR BLD AUTO: 7 %
NEUTROPHILS # BLD AUTO: 3.83 X10(3)/MCL (ref 2.1–9.2)
NEUTROPHILS NFR BLD AUTO: 63.5 %
NRBC BLD AUTO-RTO: 0 %
PLATELET # BLD AUTO: 180 X10(3)/MCL (ref 130–400)
PMV BLD AUTO: 10.7 FL (ref 7.4–10.4)
POTASSIUM SERPL-SCNC: 4.6 MMOL/L (ref 3.5–5.1)
PROT SERPL-MCNC: 7.1 GM/DL (ref 6.4–8.3)
RBC # BLD AUTO: 5.12 X10(6)/MCL (ref 4.7–6.1)
SODIUM SERPL-SCNC: 143 MMOL/L (ref 136–145)
TRIGL SERPL-MCNC: 132 MG/DL (ref 34–140)
TSH SERPL-ACNC: 1.68 UIU/ML (ref 0.35–4.94)
URATE SERPL-MCNC: 7.5 MG/DL (ref 3.5–7.2)
VLDLC SERPL CALC-MCNC: 26 MG/DL
WBC # BLD AUTO: 6.03 X10(3)/MCL (ref 4.5–11.5)

## 2025-05-12 PROCEDURE — 80053 COMPREHEN METABOLIC PANEL: CPT

## 2025-05-12 PROCEDURE — 84443 ASSAY THYROID STIM HORMONE: CPT

## 2025-05-12 PROCEDURE — 85025 COMPLETE CBC W/AUTO DIFF WBC: CPT

## 2025-05-12 PROCEDURE — 84550 ASSAY OF BLOOD/URIC ACID: CPT

## 2025-05-12 PROCEDURE — 80061 LIPID PANEL: CPT

## 2025-05-12 PROCEDURE — 36415 COLL VENOUS BLD VENIPUNCTURE: CPT

## 2025-05-13 ENCOUNTER — OFFICE VISIT (OUTPATIENT)
Dept: INTERNAL MEDICINE | Facility: CLINIC | Age: 60
End: 2025-05-13
Payer: COMMERCIAL

## 2025-05-13 VITALS
WEIGHT: 213 LBS | DIASTOLIC BLOOD PRESSURE: 88 MMHG | HEIGHT: 66 IN | SYSTOLIC BLOOD PRESSURE: 132 MMHG | RESPIRATION RATE: 18 BRPM | OXYGEN SATURATION: 100 % | BODY MASS INDEX: 34.23 KG/M2 | HEART RATE: 65 BPM

## 2025-05-13 DIAGNOSIS — Z00.00 WELLNESS EXAMINATION: Primary | ICD-10-CM

## 2025-05-13 DIAGNOSIS — E04.2 MULTIPLE THYROID NODULES: ICD-10-CM

## 2025-05-13 DIAGNOSIS — M10.9 GOUT, UNSPECIFIED CAUSE, UNSPECIFIED CHRONICITY, UNSPECIFIED SITE: ICD-10-CM

## 2025-05-13 DIAGNOSIS — I10 HYPERTENSION, UNSPECIFIED TYPE: ICD-10-CM

## 2025-05-13 DIAGNOSIS — E78.5 HYPERLIPIDEMIA, UNSPECIFIED HYPERLIPIDEMIA TYPE: ICD-10-CM

## 2025-05-13 DIAGNOSIS — G47.33 OBSTRUCTIVE SLEEP APNEA SYNDROME: ICD-10-CM

## 2025-05-13 NOTE — PROGRESS NOTES
"Patient ID: [id number]      Subjective:     Chief Complaint: Follow-up      Mariano Fontaine Jr. is a 59 y.o. male.  Is a 59-year-old man in for follow-up of medical problems.  When he was here last time it did recommend the use of allopurinol for gout prophylaxis.  He chose not to take it.  He has done relatively well with only 1 gout attacks that has last visit and that was about 2 weeks ago and was fairly brief.  He feels well otherwise except for recent knee strain.  We also discussed his diet in his seems that has much room for improvement.  In particular he should go down on total calories.    Follow-up        Review of Systems    Outpatient Medications Marked as Taking for the 5/13/25 encounter (Office Visit) with Kevin Carmen MD   Medication Sig Dispense Refill    indomethacin (INDOCIN) 25 MG capsule Take 1 capsule (25 mg total) by mouth 3 (three) times daily as needed (gout). 30 capsule 1    losartan (COZAAR) 100 MG tablet Take 1 tablet (100 mg total) by mouth once daily. 90 tablet 3       Objective:     /88 (BP Location: Right arm, Patient Position: Sitting)   Pulse 65   Resp 18   Ht 5' 6" (1.676 m)   Wt 96.6 kg (213 lb)   SpO2 100%   BMI 34.38 kg/m²     Physical Exam  Vitals reviewed.   Constitutional:       Appearance: Normal appearance.   HENT:      Head: Normocephalic and atraumatic.      Mouth/Throat:      Pharynx: Oropharynx is clear.   Eyes:      Pupils: Pupils are equal, round, and reactive to light.   Cardiovascular:      Rate and Rhythm: Normal rate and regular rhythm.      Pulses: Normal pulses.      Heart sounds: Normal heart sounds.   Pulmonary:      Breath sounds: Normal breath sounds.   Abdominal:      General: Abdomen is flat.      Palpations: Abdomen is soft.   Musculoskeletal:      Cervical back: Neck supple.   Skin:     General: Skin is warm and dry.   Neurological:      Mental Status: He is alert.     Laboratory studies reviewed    Assessment:     1. Multinodular goiter. "  Stable by ultrasound last year.  Plan to repeat in 6 months     2. Hypertension.  Acceptable control on losartan alone.  No longer on HCTZ because of the gout     3. Gout.  Attacks appear to be infrequent and uric acid only mildly elevated     4. Obstructive sleep apnea.  Stable     5. Obesity.  Contributing to all problems     6. Hyperlipidemia.  Triglyceride better but LDL is up and HDL is down.  However not bad enough to warrant medications yet      Plan:   Continue same meds.  Increased TLC.  Follow-up 6 months for annual checkup with CBC CMP lipid TSH PSA prior.  Will also get ultrasound thyroid in 6 months.  Problem List Items Addressed This Visit          Cardiac/Vascular    Hyperlipidemia    Relevant Orders    CBC Auto Differential    Comprehensive Metabolic Panel    Lipid Panel    TSH    Uric Acid    PSA, Screening    US Thyroid       Orthopedic    Gout    Relevant Orders    CBC Auto Differential    Comprehensive Metabolic Panel    Lipid Panel    TSH    Uric Acid    PSA, Screening    US Thyroid       Other    Obstructive sleep apnea syndrome     Other Visit Diagnoses         Wellness examination    -  Primary    Relevant Orders    CBC Auto Differential    Comprehensive Metabolic Panel    Lipid Panel    TSH    Uric Acid    PSA, Screening    US Thyroid      Multiple thyroid nodules        Relevant Orders    CBC Auto Differential    Comprehensive Metabolic Panel    Lipid Panel    TSH    Uric Acid    PSA, Screening    US Thyroid      Hypertension, unspecified type        Relevant Orders    CBC Auto Differential    Comprehensive Metabolic Panel    Lipid Panel    TSH    Uric Acid    PSA, Screening    US Thyroid             Orders Placed This Encounter   Procedures    US Thyroid     Standing Status:   Future     Expected Date:   11/13/2025     Expiration Date:   5/13/2026     May the Radiologist modify the order per protocol to meet the clinical needs of the patient?:   Yes     Release to patient:   Immediate     CBC Auto Differential     Standing Status:   Future     Expected Date:   11/13/2025     Expiration Date:   5/13/2026    Comprehensive Metabolic Panel     Standing Status:   Future     Expected Date:   11/13/2025     Expiration Date:   5/13/2026    Lipid Panel     Standing Status:   Future     Expected Date:   11/13/2025     Expiration Date:   5/13/2026    TSH     Standing Status:   Future     Expected Date:   11/13/2025     Expiration Date:   5/13/2026    Uric Acid     Standing Status:   Future     Expected Date:   11/13/2025     Expiration Date:   7/12/2026    PSA, Screening     Standing Status:   Future     Expected Date:   11/13/2025     Expiration Date:   5/13/2026        Medication List with Changes/Refills   Current Medications    INDOMETHACIN (INDOCIN) 25 MG CAPSULE    Take 1 capsule (25 mg total) by mouth 3 (three) times daily as needed (gout).       Start Date: 9/4/2024  End Date: --    LOSARTAN (COZAAR) 100 MG TABLET    Take 1 tablet (100 mg total) by mouth once daily.       Start Date: 9/4/2024  End Date: 9/4/2025   Discontinued Medications    ALLOPURINOL (ZYLOPRIM) 300 MG TABLET    Take 1 tablet (300 mg total) by mouth once daily.       Start Date: 10/15/2024End Date: 5/13/2025            Follow up in about 6 months (around 11/13/2025). In addition to their scheduled follow up, the patient has also been instructed to follow up on as needed basis.       Kevin Carmen